# Patient Record
Sex: MALE | Race: OTHER | NOT HISPANIC OR LATINO | URBAN - METROPOLITAN AREA
[De-identification: names, ages, dates, MRNs, and addresses within clinical notes are randomized per-mention and may not be internally consistent; named-entity substitution may affect disease eponyms.]

---

## 2020-05-05 ENCOUNTER — INPATIENT (INPATIENT)
Facility: HOSPITAL | Age: 85
LOS: 5 days | Discharge: HOME | DRG: 480 | End: 2020-05-11
Attending: FAMILY MEDICINE | Admitting: HOSPITALIST
Payer: MEDICARE

## 2020-05-05 VITALS
OXYGEN SATURATION: 95 % | SYSTOLIC BLOOD PRESSURE: 145 MMHG | RESPIRATION RATE: 16 BRPM | HEART RATE: 77 BPM | WEIGHT: 149.91 LBS | HEIGHT: 67 IN | DIASTOLIC BLOOD PRESSURE: 71 MMHG

## 2020-05-05 DIAGNOSIS — Z71.89 OTHER SPECIFIED COUNSELING: ICD-10-CM

## 2020-05-05 DIAGNOSIS — E11.9 TYPE 2 DIABETES MELLITUS WITHOUT COMPLICATIONS: ICD-10-CM

## 2020-05-05 DIAGNOSIS — S72.92XA UNSPECIFIED FRACTURE OF LEFT FEMUR, INITIAL ENCOUNTER FOR CLOSED FRACTURE: ICD-10-CM

## 2020-05-05 DIAGNOSIS — Z96.659 PRESENCE OF UNSPECIFIED ARTIFICIAL KNEE JOINT: Chronic | ICD-10-CM

## 2020-05-05 DIAGNOSIS — Z29.9 ENCOUNTER FOR PROPHYLACTIC MEASURES, UNSPECIFIED: ICD-10-CM

## 2020-05-05 DIAGNOSIS — S32.592A OTHER SPECIFIED FRACTURE OF LEFT PUBIS, INITIAL ENCOUNTER FOR CLOSED FRACTURE: ICD-10-CM

## 2020-05-05 DIAGNOSIS — C34.90 MALIGNANT NEOPLASM OF UNSPECIFIED PART OF UNSPECIFIED BRONCHUS OR LUNG: ICD-10-CM
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
## 2020-05-05 DIAGNOSIS — N40.0 BENIGN PROSTATIC HYPERPLASIA WITHOUT LOWER URINARY TRACT SYMPTOMS: ICD-10-CM

## 2020-05-05 DIAGNOSIS — Z90.49 ACQUIRED ABSENCE OF OTHER SPECIFIED PARTS OF DIGESTIVE TRACT: Chronic | ICD-10-CM

## 2020-05-05 DIAGNOSIS — J12.9 VIRAL PNEUMONIA, UNSPECIFIED: ICD-10-CM

## 2020-05-05 DIAGNOSIS — F03.90 UNSPECIFIED DEMENTIA WITHOUT BEHAVIORAL DISTURBANCE: ICD-10-CM

## 2020-05-05 DIAGNOSIS — I62.03 NONTRAUMATIC CHRONIC SUBDURAL HEMORRHAGE: ICD-10-CM

## 2020-05-05 LAB
ANION GAP SERPL CALC-SCNC: 6 MMOL/L — SIGNIFICANT CHANGE UP (ref 5–17)
APTT BLD: 29.5 SEC — SIGNIFICANT CHANGE UP (ref 28.5–37)
BASOPHILS # BLD AUTO: 0.06 K/UL — SIGNIFICANT CHANGE UP (ref 0–0.2)
BASOPHILS NFR BLD AUTO: 0.6 % — SIGNIFICANT CHANGE UP (ref 0–2)
BUN SERPL-MCNC: 14 MG/DL — SIGNIFICANT CHANGE UP (ref 7–23)
CALCIUM SERPL-MCNC: 9.3 MG/DL — SIGNIFICANT CHANGE UP (ref 8.5–10.1)
CHLORIDE SERPL-SCNC: 100 MMOL/L — SIGNIFICANT CHANGE UP (ref 96–108)
CO2 SERPL-SCNC: 27 MMOL/L — SIGNIFICANT CHANGE UP (ref 22–31)
CREAT SERPL-MCNC: 0.56 MG/DL — SIGNIFICANT CHANGE UP (ref 0.5–1.3)
EOSINOPHIL # BLD AUTO: 0.13 K/UL — SIGNIFICANT CHANGE UP (ref 0–0.5)
EOSINOPHIL NFR BLD AUTO: 1.2 % — SIGNIFICANT CHANGE UP (ref 0–6)
GLUCOSE SERPL-MCNC: 122 MG/DL — HIGH (ref 70–99)
HCT VFR BLD CALC: 33.1 % — LOW (ref 39–50)
HGB BLD-MCNC: 10.3 G/DL — LOW (ref 13–17)
IMM GRANULOCYTES NFR BLD AUTO: 3.9 % — HIGH (ref 0–1.5)
INR BLD: 1 RATIO — SIGNIFICANT CHANGE UP (ref 0.88–1.16)
LYMPHOCYTES # BLD AUTO: 1.71 K/UL — SIGNIFICANT CHANGE UP (ref 1–3.3)
LYMPHOCYTES # BLD AUTO: 16.3 % — SIGNIFICANT CHANGE UP (ref 13–44)
MCHC RBC-ENTMCNC: 23.8 PG — LOW (ref 27–34)
MCHC RBC-ENTMCNC: 31.1 GM/DL — LOW (ref 32–36)
MCV RBC AUTO: 76.4 FL — LOW (ref 80–100)
MONOCYTES # BLD AUTO: 1.38 K/UL — HIGH (ref 0–0.9)
MONOCYTES NFR BLD AUTO: 13.1 % — SIGNIFICANT CHANGE UP (ref 2–14)
NEUTROPHILS # BLD AUTO: 6.82 K/UL — SIGNIFICANT CHANGE UP (ref 1.8–7.4)
NEUTROPHILS NFR BLD AUTO: 64.9 % — SIGNIFICANT CHANGE UP (ref 43–77)
NRBC # BLD: 0 /100 WBCS — SIGNIFICANT CHANGE UP (ref 0–0)
PLATELET # BLD AUTO: 391 K/UL — SIGNIFICANT CHANGE UP (ref 150–400)
POTASSIUM SERPL-MCNC: 4.2 MMOL/L — SIGNIFICANT CHANGE UP (ref 3.5–5.3)
POTASSIUM SERPL-SCNC: 4.2 MMOL/L — SIGNIFICANT CHANGE UP (ref 3.5–5.3)
PROTHROM AB SERPL-ACNC: 11.2 SEC — SIGNIFICANT CHANGE UP (ref 10–12.9)
RBC # BLD: 4.33 M/UL — SIGNIFICANT CHANGE UP (ref 4.2–5.8)
RBC # FLD: 17.3 % — HIGH (ref 10.3–14.5)
SODIUM SERPL-SCNC: 133 MMOL/L — LOW (ref 135–145)
WBC # BLD: 10.51 K/UL — HIGH (ref 3.8–10.5)
WBC # FLD AUTO: 10.51 K/UL — HIGH (ref 3.8–10.5)

## 2020-05-05 PROCEDURE — 73552 X-RAY EXAM OF FEMUR 2/>: CPT | Mod: 26,LT

## 2020-05-05 PROCEDURE — 73502 X-RAY EXAM HIP UNI 2-3 VIEWS: CPT | Mod: 26,LT

## 2020-05-05 PROCEDURE — 99223 1ST HOSP IP/OBS HIGH 75: CPT | Mod: GC

## 2020-05-05 PROCEDURE — 73700 CT LOWER EXTREMITY W/O DYE: CPT | Mod: 26,LT,GV

## 2020-05-05 PROCEDURE — 71045 X-RAY EXAM CHEST 1 VIEW: CPT | Mod: 26

## 2020-05-05 PROCEDURE — 73562 X-RAY EXAM OF KNEE 3: CPT | Mod: 26,LT,GV

## 2020-05-05 PROCEDURE — 70450 CT HEAD/BRAIN W/O DYE: CPT | Mod: 26

## 2020-05-05 PROCEDURE — 73552 X-RAY EXAM OF FEMUR 2/>: CPT | Mod: 26,RT,77

## 2020-05-05 PROCEDURE — 99284 EMERGENCY DEPT VISIT MOD MDM: CPT | Mod: CS

## 2020-05-05 PROCEDURE — 93010 ELECTROCARDIOGRAM REPORT: CPT | Mod: GV

## 2020-05-05 PROCEDURE — 73590 X-RAY EXAM OF LOWER LEG: CPT | Mod: 26,50,GV

## 2020-05-05 PROCEDURE — 73090 X-RAY EXAM OF FOREARM: CPT | Mod: 26,50,GV

## 2020-05-05 PROCEDURE — 72192 CT PELVIS W/O DYE: CPT | Mod: GV

## 2020-05-05 PROCEDURE — 73060 X-RAY EXAM OF HUMERUS: CPT | Mod: 26,GV

## 2020-05-05 PROCEDURE — 73552 X-RAY EXAM OF FEMUR 2/>: CPT | Mod: 26,LT,77

## 2020-05-05 RX ORDER — METFORMIN HYDROCHLORIDE 850 MG/1
1 TABLET ORAL
Qty: 0 | Refills: 0 | DISCHARGE

## 2020-05-05 RX ORDER — POLYETHYLENE GLYCOL 3350 17 G/17G
17 POWDER, FOR SOLUTION ORAL DAILY
Refills: 0 | Status: DISCONTINUED | OUTPATIENT
Start: 2020-05-05 | End: 2020-05-07

## 2020-05-05 RX ORDER — GLUCAGON INJECTION, SOLUTION 0.5 MG/.1ML
1 INJECTION, SOLUTION SUBCUTANEOUS ONCE
Refills: 0 | Status: DISCONTINUED | OUTPATIENT
Start: 2020-05-05 | End: 2020-05-07

## 2020-05-05 RX ORDER — SENNA PLUS 8.6 MG/1
2 TABLET ORAL AT BEDTIME
Refills: 0 | Status: DISCONTINUED | OUTPATIENT
Start: 2020-05-05 | End: 2020-05-07

## 2020-05-05 RX ORDER — OXYCODONE AND ACETAMINOPHEN 5; 325 MG/1; MG/1
2 TABLET ORAL EVERY 6 HOURS
Refills: 0 | Status: DISCONTINUED | OUTPATIENT
Start: 2020-05-05 | End: 2020-05-07

## 2020-05-05 RX ORDER — INSULIN LISPRO 100/ML
VIAL (ML) SUBCUTANEOUS EVERY 6 HOURS
Refills: 0 | Status: DISCONTINUED | OUTPATIENT
Start: 2020-05-05 | End: 2020-05-07

## 2020-05-05 RX ORDER — SODIUM CHLORIDE 9 MG/ML
1000 INJECTION, SOLUTION INTRAVENOUS
Refills: 0 | Status: DISCONTINUED | OUTPATIENT
Start: 2020-05-05 | End: 2020-05-07

## 2020-05-05 RX ORDER — ACETAMINOPHEN 500 MG
650 TABLET ORAL EVERY 6 HOURS
Refills: 0 | Status: DISCONTINUED | OUTPATIENT
Start: 2020-05-05 | End: 2020-05-07

## 2020-05-05 RX ORDER — MORPHINE SULFATE 50 MG/1
2 CAPSULE, EXTENDED RELEASE ORAL EVERY 6 HOURS
Refills: 0 | Status: DISCONTINUED | OUTPATIENT
Start: 2020-05-05 | End: 2020-05-07

## 2020-05-05 RX ORDER — HEPARIN SODIUM 5000 [USP'U]/ML
5000 INJECTION INTRAVENOUS; SUBCUTANEOUS EVERY 8 HOURS
Refills: 0 | Status: DISCONTINUED | OUTPATIENT
Start: 2020-05-05 | End: 2020-05-05

## 2020-05-05 RX ORDER — TAMSULOSIN HYDROCHLORIDE 0.4 MG/1
1 CAPSULE ORAL
Qty: 0 | Refills: 0 | DISCHARGE

## 2020-05-05 RX ORDER — DEXTROSE 50 % IN WATER 50 %
15 SYRINGE (ML) INTRAVENOUS ONCE
Refills: 0 | Status: DISCONTINUED | OUTPATIENT
Start: 2020-05-05 | End: 2020-05-07

## 2020-05-05 RX ORDER — FINASTERIDE 5 MG/1
1 TABLET, FILM COATED ORAL
Qty: 0 | Refills: 0 | DISCHARGE

## 2020-05-05 RX ORDER — ALBUTEROL 90 UG/1
2 AEROSOL, METERED ORAL EVERY 6 HOURS
Refills: 0 | Status: DISCONTINUED | OUTPATIENT
Start: 2020-05-05 | End: 2020-05-07

## 2020-05-05 RX ORDER — DEXTROSE 50 % IN WATER 50 %
25 SYRINGE (ML) INTRAVENOUS ONCE
Refills: 0 | Status: DISCONTINUED | OUTPATIENT
Start: 2020-05-05 | End: 2020-05-07

## 2020-05-05 RX ORDER — TAMSULOSIN HYDROCHLORIDE 0.4 MG/1
0.4 CAPSULE ORAL AT BEDTIME
Refills: 0 | Status: DISCONTINUED | OUTPATIENT
Start: 2020-05-05 | End: 2020-05-07

## 2020-05-05 RX ORDER — FINASTERIDE 5 MG/1
5 TABLET, FILM COATED ORAL DAILY
Refills: 0 | Status: DISCONTINUED | OUTPATIENT
Start: 2020-05-05 | End: 2020-05-07

## 2020-05-05 RX ORDER — OXYCODONE AND ACETAMINOPHEN 5; 325 MG/1; MG/1
1 TABLET ORAL EVERY 6 HOURS
Refills: 0 | Status: DISCONTINUED | OUTPATIENT
Start: 2020-05-05 | End: 2020-05-07

## 2020-05-05 RX ORDER — DEXTROSE 50 % IN WATER 50 %
12.5 SYRINGE (ML) INTRAVENOUS ONCE
Refills: 0 | Status: DISCONTINUED | OUTPATIENT
Start: 2020-05-05 | End: 2020-05-07

## 2020-05-05 NOTE — ED PROVIDER NOTE - OBJECTIVE STATEMENT
84 y/o male with PMHx Bone cancer with mets, BPH, Dementia, Diabetes, Lung cancer, TIA BIBA from home due to fall. pt fell out of bed. As per EMS, concern for hip fx. 86 y/o male with PMHx Lung cancer with mets to bones, BPH, Dementia, Diabetes, Lung cancer, TIA BIBA from home due to fall. pt fell out of bed. As per EMS, concern for hip fx.

## 2020-05-05 NOTE — H&P ADULT - ATTENDING COMMENTS
evaluated 5/5/2020  86 y/o male with PMHx Lung cancer with mets to bones, BPH, Dementia, Non-insulin dependent T2DM, Lung cancer, TIA metastatic pathologic fracture. Also cxr findings suggestive of atypical pneumonia, r/o COVID 19.   Ortho to evaluate for palliative surgery.  Pathologic fx.  Currently on home hospice.  MOLST form filled out.    Medically optimized for procedure.  Daughter was called and aware of admission and care.

## 2020-05-05 NOTE — H&P ADULT - PROBLEM SELECTOR PLAN 3
Followup XR of b/l forearm, humerus, b/l tibia + fibula and R femur  - Ortho following- Dr. Silverman  - Plan as above #1

## 2020-05-05 NOTE — H&P ADULT - PROBLEM SELECTOR PLAN 2
Acute hypoxic respiratory failure 2/2 PNA in setting of suspected COVID19 viral illness given clinical presentation, CXR findings suggestive of COVID pneumonia  - Days of symptoms- difficult to assess as pt takes albuterol inhaler prn and is demented so unable to verbalize symptoms. Per daughter, pt had occasional cough for ~1 month  - saturating well on room air. Consider nasal cannula if needed  -  AVOID HFNC/Bipap/Nebs  - Contact and droplet isolation precautions  - Check CRP, ferritin, procalc   - Tylenol prn for fever  - may use albuterol/ipratroprium inhaler  - Start supplemental oxygen therapy immediately if pt has respiratory distress target SpO2 > 90%  - NEWS2 score 0 (if >= 7 high risk, consider ICU consult)  - monitor respiratory status closely  - patient is at a low risk of decompensation at this time  - Code Status: DNR/DNI, on home hospice  - MOLST form: chart Acute hypoxic respiratory failure 2/2 PNA in setting of suspected COVID19 viral illness given clinical presentation, CXR findings suggestive of COVID pneumonia  - COVID 19 test pending  - Days of symptoms- difficult to assess as pt takes albuterol inhaler prn and is demented so unable to verbalize symptoms. Per daughter, pt had occasional cough for ~1 month  - saturating well on room air. Consider nasal cannula if needed  -  AVOID HFNC/Bipap/Nebs  - Contact and droplet isolation precautions  - Check CRP, ferritin, procalc   - Tylenol prn for fever  - may use albuterol/ipratroprium inhaler  - Start supplemental oxygen therapy immediately if pt has respiratory distress target SpO2 > 90%  - NEWS2 score 0 (if >= 7 high risk, consider ICU consult)  - monitor respiratory status closely  - patient is at a low risk of decompensation at this time  - Code Status: DNR/DNI, on home hospice  - MOLST form: chart

## 2020-05-05 NOTE — H&P ADULT - PROBLEM SELECTOR PLAN 10
Pt on home hospice  - Had discussion with daughter, Pati Cuba [phone] who is HCP  - Pt is DNR/DNI. Goal is for patient to be comfortable. MOLST completed and placed in chart.

## 2020-05-05 NOTE — H&P ADULT - PROBLEM SELECTOR PLAN 6
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

## 2020-05-05 NOTE — ED ADULT NURSE NOTE - PMH
Bone cancer due to spread from other site    BPH (benign prostatic hyperplasia)    Dementia    Diabetes    Lung cancer    TIA (transient ischemic attack)

## 2020-05-05 NOTE — GOALS OF CARE CONVERSATION - ADVANCED CARE PLANNING - CONVERSATION DETAILS
Pt is on home hospice. Has MOLST from [facility] at home. Per daughter, Pati Cuba, [phone], pt is DNR/DNI.

## 2020-05-05 NOTE — H&P ADULT - ASSESSMENT
84 y/o male with PMHx Lung cancer with mets to bones, BPH, Dementia, Non-insulin dependent T2DM, Lung cancer, TIA BIBA from home due to fall out of bed. Found to have destructive lytic lesion involving the left superior pubic ramus, likely metastatic pathologic fracture. Also cxr findings suggestive of atypical pneumonia, r/o COVID 19.

## 2020-05-05 NOTE — ED PROVIDER NOTE - CARE PLAN
Principal Discharge DX:	Femur fracture, left  Secondary Diagnosis:	Viral pneumonia Principal Discharge DX:	Femur fracture, left  Secondary Diagnosis:	Viral pneumonia  Secondary Diagnosis:	Subdural hematoma

## 2020-05-05 NOTE — ED PROVIDER NOTE - ATTENDING CONTRIBUTION TO CARE
84 yo male with fall few weeks ago and since that time has been unable to get out of bed or ambulate. Exam revealed male in mild distress with marked tenderness and pain left mid thigh with intact N/V LLE. I agree with plan and management outlined by PA.

## 2020-05-05 NOTE — ED PROVIDER NOTE - PHYSICAL EXAMINATION
Constitutional: Awake, non-toxic. NAD.   HEAD: Normocephalic, atraumatic.   EYES: PERRL, EOM intact, conjunctiva and sclera are clear bilaterally. No raccoon eyes.   ENT: No nguyen sign. No rhinorrhea, mucous membranes pink/moist,  no drooling or stridor.   NECK: Supple, non-tender  BACK: No midline or paraspinal TTP of cervical/thoracic/lumbar spine, FROM. No ecchymosis or hematomas.   CARDIOVASCULAR: Normal S1, S2; regular rate and rhythm.  RESPIRATORY: Normal respiratory effort; breath sounds CTAB, no wheezes, rhonchi, or rales. Speaking in full sentences. No accessory muscle use.   ABDOMEN: Soft; non-tender, non-distended.   EXTREMITIES: Full passive and active ROM in all extremities; (+) left hip TTP, (+) external rotation, (+) left hip bruising; distal pulses palpable and symmetric, no edema, no crepitus or step off  SKIN: Warm, dry; no apparent lesions or rashes, brisk capillary refill.  NEURO: Cn 2-12 intact, not following commands or answering questions , hand  intact, no facial droop.

## 2020-05-05 NOTE — H&P ADULT - NSHPPHYSICALEXAM_GEN_ALL_CORE
Vital Signs Last 24 Hrs  HR: 77 (05 May 2020 18:44) (77 - 77)  BP: 145/71 (05 May 2020 18:44) (145/71 - 145/71)  RR: 16 (05 May 2020 18:44) (16 - 16)  SpO2: 95% (05 May 2020 18:44) (95% - 95%)    Physical Exam:  General: Well developed, well nourished, NAD  HEENT: NCAT, PERRLA, EOMI bl, moist mucous membranes   Neck: Supple, nontender, no mass  Neurology: A&Ox3, nonfocal, CN II-XII grossly intact, sensation intact, no gait abnormalities   Respiratory: CTA B/L, No W/R/R  CV: RRR, +S1/S2, no murmurs, rubs or gallops  Abdominal: Soft, NT, ND +BSx4  Extremities: No C/C/E, + peripheral pulses  MSK: Normal ROM, no joint erythema or warmth, no joint swelling   Skin: warm, dry, normal color, no rash or abnormal lesions Vital Signs Last 24 Hrs  HR: 77 (05 May 2020 18:44) (77 - 77)  BP: 145/71 (05 May 2020 18:44) (145/71 - 145/71)  RR: 16 (05 May 2020 18:44) (16 - 16)  SpO2: 95% (05 May 2020 18:44) (95% - 95%)    Physical Exam:  General: elderly, frail male, lying in bed comfortably, NAD  HEENT: NCAT, dry mucous membranes, Hard of hearing  Neurology: A&Ox1 (person), unable to follow commands  Respiratory: RUL exp wheezing, remainder of lungs clear, no rhonchi, no rales  CV: RRR, +S1/S2, no murmurs, rubs or gallops  Abdominal: Soft, NT, ND +BSx4  Extremities: No C/C/E, + peripheral pulses  MSK: no joint swelling   Skin: large ecchymosis of left lateral hip/thigh extending down to above knee

## 2020-05-05 NOTE — ED PROVIDER NOTE - CLINICAL SUMMARY MEDICAL DECISION MAKING FREE TEXT BOX
BIBA from home due to fall last night. Reports left hip injury. Plan includes Xrays hip/femur/knee r/o fx/dislocation, ct head r/o intracranial bleed, re-assess

## 2020-05-05 NOTE — H&P ADULT - NSHPOUTPATIENTPROVIDERS_GEN_ALL_CORE
PMD- Dr. Virgil Sampson PMD- Dr. Virgil Sampson  Oncologist- Dr. White PMD- Dr. Virgil Sampson  Oncologist- Dr. Mauri White

## 2020-05-05 NOTE — H&P ADULT - PROBLEM SELECTOR PLAN 7
Chronic, stable  - reorient as needed  - Caution with BEERs medications  - poor PO intake. Consider speech and swallow when no longer NPO. Add glucerna to diet when able to be PO

## 2020-05-05 NOTE — H&P ADULT - PROBLEM SELECTOR PLAN 9
IMPROVE VTE Individual Risk Assessment          RISK                                                          Points  [  ] Previous VTE                                                3  [  ] Thrombophilia                                             2  [ x ] Lower limb paralysis                                   2        (unable to hold up >15 seconds)    [x  ] Current Cancer                                             2         (within 6 months)  [x  ] Immobilization > 24 hrs                              1  [  ] ICU/CCU stay > 24 hours                             1  [x  ] Age > 60                                                         1    IMPROVE VTE Score: 6  DVT ppx: Heparin sq

## 2020-05-05 NOTE — ED ADULT NURSE NOTE - OBJECTIVE STATEMENT
Pt from hospice, unwintessed fall out of bed last night, left leg/hip pain, outer part of upper hip/thigh is ecchymotic, pt has dementia and unable to provide any information, pt is DNR, afebrile, will continue to monitor

## 2020-05-05 NOTE — H&P ADULT - NSICDXPASTMEDICALHX_GEN_ALL_CORE_FT
PAST MEDICAL HISTORY:  Bone cancer due to spread from other site     BPH (benign prostatic hyperplasia)     Dementia     Diabetes     Lung cancer     TIA (transient ischemic attack) PAST MEDICAL HISTORY:  Bone cancer due to spread from other site     BPH (benign prostatic hyperplasia)     Dementia     Diabetes not on insulin    Lung cancer     TIA (transient ischemic attack)

## 2020-05-05 NOTE — H&P ADULT - NSHPSOCIALHISTORY_GEN_ALL_CORE
Lives with daughter. On home hospice  Ambulated occasionally with assistance. Bedridden for the last week.   Tobacco: heavy smoker, 4ppd x50 years, quit 15 years ago  Etoh: hx of heavy drinking, quit years ago  Recreational drugs: denies

## 2020-05-05 NOTE — H&P ADULT - PROBLEM SELECTOR PLAN 1
Admit to [facility]  - s/p fall causing pathologic fracture   - XR showed Destructive lytic lesion involving the left superior pubic ramus. Sclerosis involving the left inferior pubic ramus.   - Ortho following (Dr. Silverman)- may require palliative surgery  - Pain management with morphine and percocet  - NPO except meds  - fall risk  - miralax and senna while on opiates  - IVF  - Pt is intermediate risk for this moderate risk procedure. Considering palliative surgery, patient is medically optimized for surgery. RCRI score 1  - heparin 5000 x1 STAT per ortho. DVT ppx per ortho

## 2020-05-05 NOTE — ED ADULT NURSE NOTE - NSIMPLEMENTINTERV_GEN_ALL_ED
Implemented All Fall with Harm Risk Interventions:  [place] to call system. Call bell, personal items and telephone within reach. Instruct patient to call for assistance. Room bathroom lighting operational. Non-slip footwear when patient is off stretcher. Physically safe environment: no spills, clutter or unnecessary equipment. Stretcher in lowest position, wheels locked, appropriate side rails in place. Provide visual cue, wrist band, yellow gown, etc. Monitor gait and stability. Monitor for mental status changes and reorient to person, place, and time. Review medications for side effects contributing to fall risk. Reinforce activity limits and safety measures with patient and family. Provide visual clues: red socks.

## 2020-05-05 NOTE — H&P ADULT - PROBLEM SELECTOR PLAN 4
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 Mauri White at [place]  - Pt on home hospice   - oxygen supplementation prn

## 2020-05-05 NOTE — ED PROVIDER NOTE - PROGRESS NOTE DETAILS
Discussed with patient daughter [phone], reports patient living at home with her and has hospice nurse, but nurse hasn't been able to come due to COVID. Reports patient fell out of bed Friday and last night. Unwitnessed fall. Reports patient was sent in as the nurse noted concern for left hip fx or dislocation. Pt DNR. notes patient has hx of dementia, not responsive, which is baseline for 6 months. PCP Maninder Sampson. She reports patient has not had other symptoms. denies SOB, cough, fever, vomiting, decreased PO, or any other complaints. Discussed with Андрей from ortho. Requesting CT. Discussed with Hospitalist, accepting admission. Discussed with Hospitalist, accepting admission. Discussed Ct findings with Dr. Guevara

## 2020-05-05 NOTE — CONSULT NOTE ADULT - SUBJECTIVE AND OBJECTIVE BOX
84 yo Male w/ severe demenita (AOx) and Lung CA (with known mets to bone) who presented to  ED after a fall from home. He is on home hospice and watched with a monitor. He has had two falls one on friday but did not seek medical attention 2/2 Concerns regarding COVID-19 and another yesterday. He typically walks with a walker but has been bedridden for the past week. Patient unable to relay acute complaints so history was largely obtained from Pati the daughter/HCP.    PAST MEDICAL & SURGICAL HISTORY:  Unspecified fracture of left femur, initial encounter for closed fracture  MEWS Score  BPH (benign prostatic hyperplasia)  TIA (transient ischemic attack)  Bone cancer due to spread from other site  Lung cancer  Dementia  Diabetes  Femur fracture, left  Need for prophylactic measure  BPH (benign prostatic hyperplasia)  Dementia  Diabetes  Lung cancer  Viral pneumonia  Pubic ramus fracture, left, closed, initial encounter  H/O total knee replacement  History of cholecystectomy  LEFT HIP PAIN  Viral pneumonia      Allergies:  No Known Allergies      Medications:      Vital Signs:  T(C): --  HR: 77 (05-05-20 @ 18:44) (77 - 77)  BP: 145/71 (05-05-20 @ 18:44) (145/71 - 145/71)  RR: 16 (05-05-20 @ 18:44) (16 - 16)  SpO2: 95% (05-05-20 @ 18:44) (95% - 95%)    Physical Exam:  Left knee:  LLE slightly short. +moderate swelling distal femur, no erythema, no ecchymosis, normothermic. +TTP over distal femur. LROM 2' pain. Calves are soft and nontender. Compartments soft and compressible. Moving all toes, sensation intact. DP and PT pulses palpable.     SECONDARY EXAM: Benign, Skin intact, SILT throughout, motor grossly intact throughout, no other orthopedic injuries at this time, compartments soft and compressible    SPINE: Skin intact, no bony tenderness or step-offs appreciated throughout cervical/thoracic/lumbar/sacral spine    B/L UE: Skin intact, no erythema, ecchymosis, edema, gross deformity, NTTP over the bony prominences of the shoulder/elbow/wrist/hand, painless passive/active ROM of the shoulder/elbow/wrist/hand, C5-T1 SILT, motor grossly intact throughout axillary/musculocutaenous/radial/median/ulnar nerves, + radial pulse    R LE: Skin intact, no erythema, ecchymosis, edema, gross deformity, NTTP over the bony prominences of the hip/knee/ankle/foot, painless passive/active ROM of the hip/knee/ankle/foot, L2-S1 SILT, motor grossly intact throughout hip flexors/quads/hams/TA/EHL/FHL/GSC, + DP/PT pulses, no pain with log roll, no pain on axial loading, compartments soft and compressible, calves nontender      Imaging:   XR L femur: periimplant distal femur fracture  CT L Femur fx: cortical scalloping suggestive of pathologic disease, official read pending  XR/CT Pelvis: severe osteolytic lesions of the left superior pubic rami and left iliac wing    Assessment:  85y Male with a left periprosthetic distal femur fracture    Plan:  Knee immobilizer placed on LLE.  Complete bedrest.  DVT prophylaxis - on hold  Pain control.  Admit to medical team    ****INCOMPLETE NOTE//IN PROGRESS***  ****INCOMPLETE NOTE//IN PROGRESS***  ****INCOMPLETE NOTE//IN PROGRESS***      Pt advised that surgical fixation is needed for right**/**left periprosthetic distal femur fracture and surgical procedure discussed in detail including all risks, benefits and alternatives; pt**/**pts family express understanding and consent for procedure.  Plan for OR tonight**/**today.  f/u medical clearance/optimization for OR.  NPO and hold chemical anticoagulation from now**/after midnight.  IVF hydration while NPO.    Case discussed with and plan as per  **** 84 yo Male w/ severe demenita (AOx) and Lung CA (with known mets to bone) who presented to  ED after a fall from home. He is on home hospice and watched with a monitor. He has had two falls one on friday but did not seek medical attention 2/2 Concerns regarding COVID-19 and another yesterday. He typically walks with a walker but has been bedridden for the past week. Patient unable to relay acute complaints so history was largely obtained from Pati the daughter/HCP.    PAST MEDICAL & SURGICAL HISTORY:  Unspecified fracture of left femur, initial encounter for closed fracture  MEWS Score  BPH (benign prostatic hyperplasia)  TIA (transient ischemic attack)  Bone cancer due to spread from other site  Lung cancer  Dementia  Diabetes  Femur fracture, left  Need for prophylactic measure  BPH (benign prostatic hyperplasia)  Dementia  Diabetes  Lung cancer  Viral pneumonia  Pubic ramus fracture, left, closed, initial encounter  H/O total knee replacement  History of cholecystectomy  LEFT HIP PAIN  Viral pneumonia      Allergies:  No Known Allergies      Medications:      Vital Signs:  T(C): --  HR: 77 (05-05-20 @ 18:44) (77 - 77)  BP: 145/71 (05-05-20 @ 18:44) (145/71 - 145/71)  RR: 16 (05-05-20 @ 18:44) (16 - 16)  SpO2: 95% (05-05-20 @ 18:44) (95% - 95%)    Physical Exam:  Left knee:  LLE slightly short. +moderate swelling distal femur, no erythema, no ecchymosis, normothermic. +TTP over distal femur. LROM 2' pain. Calves are soft and nontender. Compartments soft and compressible. Moving all toes, sensation intact. DP and PT pulses palpable.     SECONDARY EXAM: Benign, Skin intact, SILT throughout, motor grossly intact throughout, no other orthopedic injuries at this time, compartments soft and compressible    SPINE: Skin intact, no bony tenderness or step-offs appreciated throughout cervical/thoracic/lumbar/sacral spine    B/L UE: Skin intact, no erythema, ecchymosis, edema, gross deformity, NTTP over the bony prominences of the shoulder/elbow/wrist/hand, painless passive/active ROM of the shoulder/elbow/wrist/hand, C5-T1 SILT, motor grossly intact throughout axillary/musculocutaenous/radial/median/ulnar nerves, + radial pulse    R LE: Skin intact, no erythema, ecchymosis, edema, gross deformity, NTTP over the bony prominences of the hip/knee/ankle/foot, painless passive/active ROM of the hip/knee/ankle/foot, L2-S1 SILT, motor grossly intact throughout hip flexors/quads/hams/TA/EHL/FHL/GSC, + DP/PT pulses, no pain with log roll, no pain on axial loading, compartments soft and compressible, calves nontender      Imaging:   XR L femur: periimplant distal femur fracture  CT L Femur fx: cortical scalloping suggestive of pathologic disease, official read pending  XR/CT Pelvis: severe osteolytic lesions of the left superior pubic rami and left iliac wing    Assessment:  85y Male with a left periprosthetic distal femur fracture    Plan:  -Cased discussed at length with daughter/HCP (Pati). Patient has a periprosthetic left distal femur fracture with osseous mets. Pt is on home hospice and DNR/DNI. In order to keep the patient out of pain, the daughter/HCP would like to go ahead with operative fixation.   -Benefits and risks reviewed with the Health care proxy. Risks addressed include but are not limited to infection, blood, loss, coagulopathy, venothromboelmoblism (DVT/PE), vessel/nerve damage, scar formation, nonunion/malunion, persistent pain, post-traumatic arthritis and the even the risk of death.  -Knee immobilizer placed on LLE.  -Complete bedrest.  -DVT prophylaxis - on hold  -Pain control.  -Admit to medical team  -Npo after midnight/IVF  -FU AM labs  -Please document medical clearance  -FU COVID-19 swab    Case discussed with Dr. Silverman who agrees with plan

## 2020-05-05 NOTE — H&P ADULT - HISTORY OF PRESENT ILLNESS
86 y/o male with PMHx Lung cancer with mets to bones, BPH, Dementia, Non-insulin dependent T2DM, Lung cancer, TIA BIBA from home due to fall out of bed. Patient unable to give history due to dementia. Multiple  attempts to call daughter, Pati Cuba ([phone]).     In the ED, patient's vitals were: /71, HR 77, RR 16 and SpO2 95% on room air. Significant labs include: wbc 10.51, H/H 10.3/33.1, MCV 76.4, Na 133.   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 No midline shift or hydrocephalus. Mildly motion limited study.  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 No midline shift or hydrocephalus. Mildly motion limited study.  CXR: Bilateral interstitial and airspace infiltrates suggestive of atypical pneumonia/viral pneumonia including COVID 19.   EKG: SR with APCs, HR 85 86 y/o male with PMHx Lung cancer with mets to bones, BPH, Dementia, Non-insulin dependent T2DM, Lung cancer, TIA BIBA from home due to fall out of bed. Patient unable to give history due to dementia. Multiple  attempts to call daughter, Pati Cuba ([phone]).     In the ED, patient's vitals were: /71, HR 77, RR 16 and SpO2 95% on room air. Significant labs include: wbc 10.51, H/H 10.3/33.1, MCV 76.4, Na 133.   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 No midline shift or hydrocephalus. Mildly motion limited study.  L XR hip/pelvis: Destructive lytic lesion involving the left superior pubic ramus. Sclerosis involving the left inferior pubic ramus. The findings are likely metastatic. Cannot rule out underlying pathologic fracture. Consider CT scan for additional diagnostic benefit.  Comminuted displaced angulated distal left femoral shaft fracture just above the femoral component of a bipolar knee prosthesis. The fracture may be pathologic.     CXR: Bilateral interstitial and airspace infiltrates suggestive of atypical pneumonia/viral pneumonia including COVID 19.   EKG: SR with APCs, HR 85 84 y/o male with PMHx Lung cancer with mets to bones on home hospice, BPH, Dementia, Non-insulin dependent T2DM, Lung cancer, TIA BIBA from home due to fall out of bed. Patient unable to give history due to dementia. History obtained from Pati Cuba ([phone]- cell). Pt fell on 5/1/20 out of bed and fell again on 5/4. States that home hospice was unable to have patient receive outpatient XR and was advised to go to hospital. Patient occasionally ambulates and has been bedridden for 1 week. Patient     In the ED, patient's vitals were: /71, HR 77, RR 16 and SpO2 95% on room air. Significant labs include: wbc 10.51, H/H 10.3/33.1, MCV 76.4, Na 133.   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 No midline shift or hydrocephalus. Mildly motion limited study.  L XR hip/pelvis: Destructive lytic lesion involving the left superior pubic ramus. Sclerosis involving the left inferior pubic ramus. The findings are likely metastatic. Cannot rule out underlying pathologic fracture. Consider CT scan for additional diagnostic benefit.  Comminuted displaced angulated distal left femoral shaft fracture just above the femoral component of a bipolar knee prosthesis. The fracture may be pathologic.     CXR: Bilateral interstitial and airspace infiltrates suggestive of atypical pneumonia/viral pneumonia including COVID 19.   EKG: SR with APCs, HR 85 84 y/o male with PMHx Lung cancer with mets to bones (dx 8/2019) on home hospice, BPH, Dementia, Non-insulin dependent T2DM, hx TIA BIBA from home due to fall out of bed. Patient unable to give history due to dementia. History obtained from Pati Cuba ([phone]- cell). Pt fell on 5/1/20 out of bed and fell again on 5/4. States that home hospice was unable to have patient receive outpatient XR and was advised to go to hospital. Patient occasionally ambulates and has been bedridden for the last week. Patient has been taking Percocet and Ativan ATC for pain but due to dementia, daughter unable to gauge his pain level. States that patient had a dry cough about a month ago and occasionally takes albuterol inhaler when he appears sob. Denies fevers/chills, n/v, diarrhea, abdominal pain, recent travel or COVID exposure. Of note, patient has not had any treatment for metastatic lung cancer due to rapid progression and onset of dementia.    In the ED, patient's vitals were: /71, HR 77, RR 16 and SpO2 95% on room air. Significant labs include: wbc 10.51, H/H 10.3/33.1, MCV 76.4, Na 133.   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 No midline shift or hydrocephalus. Mildly motion limited study.  L XR hip/pelvis: Destructive lytic lesion involving the left superior pubic ramus. Sclerosis involving the left inferior pubic ramus. The findings are likely metastatic. Cannot rule out underlying pathologic fracture. Consider CT scan for additional diagnostic benefit.  Comminuted displaced angulated distal left femoral shaft fracture just above the femoral component of a bipolar knee prosthesis. The fracture may be pathologic.   CXR: Bilateral interstitial and airspace infiltrates suggestive of atypical pneumonia/viral pneumonia including COVID 19.   CT LLE and pelvis pending results  EKG: SR with APCs, HR 85

## 2020-05-06 LAB
A1C WITH ESTIMATED AVERAGE GLUCOSE RESULT: 7.2 % — HIGH (ref 4–5.6)
ABO RH CONFIRMATION: SIGNIFICANT CHANGE UP
ANION GAP SERPL CALC-SCNC: 9 MMOL/L — SIGNIFICANT CHANGE UP (ref 5–17)
APTT BLD: 24.4 SEC — LOW (ref 28.5–37)
BASOPHILS # BLD AUTO: 0.06 K/UL — SIGNIFICANT CHANGE UP (ref 0–0.2)
BASOPHILS NFR BLD AUTO: 0.5 % — SIGNIFICANT CHANGE UP (ref 0–2)
BUN SERPL-MCNC: 14 MG/DL — SIGNIFICANT CHANGE UP (ref 7–23)
CALCIUM SERPL-MCNC: 9.8 MG/DL — SIGNIFICANT CHANGE UP (ref 8.5–10.1)
CHLORIDE SERPL-SCNC: 100 MMOL/L — SIGNIFICANT CHANGE UP (ref 96–108)
CO2 SERPL-SCNC: 26 MMOL/L — SIGNIFICANT CHANGE UP (ref 22–31)
CREAT SERPL-MCNC: 0.6 MG/DL — SIGNIFICANT CHANGE UP (ref 0.5–1.3)
CRP SERPL-MCNC: 5.17 MG/DL — HIGH (ref 0–0.4)
D DIMER BLD IA.RAPID-MCNC: 976 NG/ML DDU — HIGH
EOSINOPHIL # BLD AUTO: 0.08 K/UL — SIGNIFICANT CHANGE UP (ref 0–0.5)
EOSINOPHIL NFR BLD AUTO: 0.7 % — SIGNIFICANT CHANGE UP (ref 0–6)
ESTIMATED AVERAGE GLUCOSE: 160 MG/DL — HIGH (ref 68–114)
FERRITIN SERPL-MCNC: 758 NG/ML — HIGH (ref 30–400)
GLUCOSE SERPL-MCNC: 121 MG/DL — HIGH (ref 70–99)
HCT VFR BLD CALC: 33.1 % — LOW (ref 39–50)
HGB BLD-MCNC: 10.2 G/DL — LOW (ref 13–17)
IMM GRANULOCYTES NFR BLD AUTO: 2.8 % — HIGH (ref 0–1.5)
INR BLD: 1.03 RATIO — SIGNIFICANT CHANGE UP (ref 0.88–1.16)
LYMPHOCYTES # BLD AUTO: 1.48 K/UL — SIGNIFICANT CHANGE UP (ref 1–3.3)
LYMPHOCYTES # BLD AUTO: 13.2 % — SIGNIFICANT CHANGE UP (ref 13–44)
MCHC RBC-ENTMCNC: 23.7 PG — LOW (ref 27–34)
MCHC RBC-ENTMCNC: 30.8 GM/DL — LOW (ref 32–36)
MCV RBC AUTO: 76.8 FL — LOW (ref 80–100)
MONOCYTES # BLD AUTO: 1.26 K/UL — HIGH (ref 0–0.9)
MONOCYTES NFR BLD AUTO: 11.2 % — SIGNIFICANT CHANGE UP (ref 2–14)
NEUTROPHILS # BLD AUTO: 8.03 K/UL — HIGH (ref 1.8–7.4)
NEUTROPHILS NFR BLD AUTO: 71.6 % — SIGNIFICANT CHANGE UP (ref 43–77)
NRBC # BLD: 0 /100 WBCS — SIGNIFICANT CHANGE UP (ref 0–0)
PLATELET # BLD AUTO: 393 K/UL — SIGNIFICANT CHANGE UP (ref 150–400)
POTASSIUM SERPL-MCNC: 4.1 MMOL/L — SIGNIFICANT CHANGE UP (ref 3.5–5.3)
POTASSIUM SERPL-SCNC: 4.1 MMOL/L — SIGNIFICANT CHANGE UP (ref 3.5–5.3)
PROTHROM AB SERPL-ACNC: 11.5 SEC — SIGNIFICANT CHANGE UP (ref 10–12.9)
RBC # BLD: 4.31 M/UL — SIGNIFICANT CHANGE UP (ref 4.2–5.8)
RBC # FLD: 17.2 % — HIGH (ref 10.3–14.5)
SARS-COV-2 RNA SPEC QL NAA+PROBE: SIGNIFICANT CHANGE UP
SODIUM SERPL-SCNC: 135 MMOL/L — SIGNIFICANT CHANGE UP (ref 135–145)
WBC # BLD: 11.23 K/UL — HIGH (ref 3.8–10.5)
WBC # FLD AUTO: 11.23 K/UL — HIGH (ref 3.8–10.5)

## 2020-05-06 PROCEDURE — 99232 SBSQ HOSP IP/OBS MODERATE 35: CPT | Mod: GV

## 2020-05-06 RX ORDER — SODIUM CHLORIDE 9 MG/ML
1000 INJECTION, SOLUTION INTRAVENOUS
Refills: 0 | Status: DISCONTINUED | OUTPATIENT
Start: 2020-05-06 | End: 2020-05-07

## 2020-05-06 RX ORDER — HEPARIN SODIUM 5000 [USP'U]/ML
5000 INJECTION INTRAVENOUS; SUBCUTANEOUS EVERY 8 HOURS
Refills: 0 | Status: COMPLETED | OUTPATIENT
Start: 2020-05-06 | End: 2020-05-06

## 2020-05-06 RX ADMIN — MORPHINE SULFATE 2 MILLIGRAM(S): 50 CAPSULE, EXTENDED RELEASE ORAL at 13:42

## 2020-05-06 RX ADMIN — Medication 0.25 MILLIGRAM(S): at 03:09

## 2020-05-06 RX ADMIN — Medication 0.25 MILLIGRAM(S): at 16:50

## 2020-05-06 RX ADMIN — Medication 0.25 MILLIGRAM(S): at 12:04

## 2020-05-06 RX ADMIN — FINASTERIDE 5 MILLIGRAM(S): 5 TABLET, FILM COATED ORAL at 11:37

## 2020-05-06 RX ADMIN — SODIUM CHLORIDE 75 MILLILITER(S): 9 INJECTION, SOLUTION INTRAVENOUS at 06:49

## 2020-05-06 RX ADMIN — HEPARIN SODIUM 5000 UNIT(S): 5000 INJECTION INTRAVENOUS; SUBCUTANEOUS at 13:43

## 2020-05-06 RX ADMIN — MORPHINE SULFATE 2 MILLIGRAM(S): 50 CAPSULE, EXTENDED RELEASE ORAL at 02:28

## 2020-05-06 RX ADMIN — Medication 0.25 MILLIGRAM(S): at 04:59

## 2020-05-06 RX ADMIN — MORPHINE SULFATE 2 MILLIGRAM(S): 50 CAPSULE, EXTENDED RELEASE ORAL at 18:22

## 2020-05-06 RX ADMIN — HEPARIN SODIUM 5000 UNIT(S): 5000 INJECTION INTRAVENOUS; SUBCUTANEOUS at 23:01

## 2020-05-06 NOTE — PROGRESS NOTE ADULT - SUBJECTIVE AND OBJECTIVE BOX
Patient is a 85y old  Male who presents with a chief complaint of Destructive lytic lesion of left superior pubic ramus (06 May 2020 08:05)      FROM ADMISSION H+P:   HPI:  84 y/o male with PMHx Lung cancer with mets to bones (dx 8/2019) on home hospice, BPH, Dementia, Non-insulin dependent T2DM, hx TIA BIBA from home due to fall out of bed. Patient unable to give history due to dementia. History obtained from Pati Cuba ([phone]- cell). Pt fell on 5/1/20 out of bed and fell again on 5/4. States that home hospice was unable to have patient receive outpatient XR and was advised to go to hospital. Patient occasionally ambulates and has been bedridden for the last week. Patient has been taking Percocet and Ativan ATC for pain but due to dementia, daughter unable to gauge his pain level. States that patient had a dry cough about a month ago and occasionally takes albuterol inhaler when he appears sob. Denies fevers/chills, n/v, diarrhea, abdominal pain, recent travel or COVID exposure. Of note, patient has not had any treatment for metastatic lung cancer due to rapid progression and onset of dementia.    In the ED, patient's vitals were: /71, HR 77, RR 16 and SpO2 95% on room air. Significant labs include: wbc 10.51, H/H 10.3/33.1, MCV 76.4, Na 133.   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 No midline shift or hydrocephalus. Mildly motion limited study.  L XR hip/pelvis: Destructive lytic lesion involving the left superior pubic ramus. Sclerosis involving the left inferior pubic ramus. The findings are likely metastatic. Cannot rule out underlying pathologic fracture. Consider CT scan for additional diagnostic benefit.  Comminuted displaced angulated distal left femoral shaft fracture just above the femoral component of a bipolar knee prosthesis. The fracture may be pathologic.   CXR: Bilateral interstitial and airspace infiltrates suggestive of atypical pneumonia/viral pneumonia including COVID 19.   CT LLE and pelvis pending results  EKG: SR with APCs, HR 85 (05 May 2020 21:27)      ----  INTERVAL HPI/OVERNIGHT EVENTS: Pt seen and evaluated at the bedside. Patient is poor historian at baseline secondary to dementia.       ----  PAST MEDICAL & SURGICAL HISTORY:  BPH (benign prostatic hyperplasia)  TIA (transient ischemic attack)  Bone cancer due to spread from other site  Lung cancer  Dementia  Diabetes: not on insulin  H/O total knee replacement  History of cholecystectomy      FAMILY HISTORY:  No pertinent family history in first degree relatives      Allergies    No Known Allergies    Intolerances        ----  REVIEW OF SYSTEMS:  as per HPI    ----  PHYSICAL EXAM:  	General: elderly, frail male, lying in bed comfortably, NAD  	HEENT: NCAT, dry mucous membranes, Hard of hearing  	Neurology: A&Ox1 (person), unable to follow commands  	Respiratory: CTA bl, no rhonchi, no rales  	CV: RRR, +S1/S2, no murmurs, rubs or gallops  	Abdominal: Soft, NT, ND +BSx4  	Extremities: No C/C/E, + peripheral pulses  	MSK: no joint swelling   Skin: large ecchymosis of left lateral hip/thigh extending down to above knee    T(C): 36.5 (05-06-20 @ 07:35), Max: 36.7 (05-06-20 @ 02:00)  HR: 83 (05-06-20 @ 07:35) (77 - 100)  BP: 137/64 (05-06-20 @ 07:35) (102/59 - 145/71)  RR: 18 (05-06-20 @ 07:35) (16 - 18)  SpO2: 94% (05-06-20 @ 07:35) (94% - 95%)  Wt(kg): --    ----  I&O's Summary      LABS:                        10.2   11.23 )-----------( 393      ( 06 May 2020 05:06 )             33.1     05-06    135  |  100  |  14  ----------------------------<  121<H>  4.1   |  26  |  0.60    Ca    9.8      06 May 2020 05:06      PT/INR - ( 06 May 2020 05:06 )   PT: 11.5 sec;   INR: 1.03 ratio         PTT - ( 06 May 2020 05:06 )  PTT:24.4 sec    CAPILLARY BLOOD GLUCOSE      POCT Blood Glucose.: 92 mg/dL (06 May 2020 11:41)  POCT Blood Glucose.: 131 mg/dL (06 May 2020 06:52)                ----  Personally reviewed:  Vital sign trends: [  ] yes    [  ] no     [  ] n/a  Laboratory results: [  ] yes    [  ] no     [  ] n/a  Radiology results: [  ] yes    [  ] no     [  ] n/a  Culture results: [  ] yes    [  ] no     [  ] n/a  Consultant recommendations: [  ] yes    [  ] no     [  ] n/a

## 2020-05-06 NOTE — PROGRESS NOTE ADULT - PROBLEM SELECTOR PLAN 1
- s/p fall causing pathologic fracture   - XR showed Destructive lytic lesion involving the left superior pubic ramus. Sclerosis involving the left inferior pubic ramus.   - Ortho following (Dr. Silverman)- recs appreciated- for palliative surgery 5/7  - Pain management with morphine and percocet  - NPO after midnight  - fall risk  - miralax and senna while on opiates  - IVF  - Pt is intermediate risk for this moderate risk procedure. Considering palliative surgery, patient is medically optimized for surgery. RCRI score 1  - heparin 5000 q8, DVT ppx per ortho

## 2020-05-06 NOTE — PROGRESS NOTE ADULT - PROBLEM SELECTOR PLAN 10
Pt on home hospice  - Had discussion with daughter, Pati Cuba [phone] who is HCP  - Pt is DNR/DNI. Goal is for patient to be comfortable. MOLST completed and placed in chart.

## 2020-05-06 NOTE — ED ADULT NURSE REASSESSMENT NOTE - NS ED NURSE REASSESS COMMENT FT1
pt admitted to floor awaiting bed assignment
pt medicated with morphine 2mg ivp as ordered still remained agitated pt remained on safety watch awaiting
Patient changed for comfort and placed on fresh pad. Patient showing signs of being in severe pain, morphine IVP given. Patient to go to medical/surgical floor. Plan of care discussed with patient. Comfort and safety maintained. Will continue to monitor.
Report received, care assumed. Patient awake, alert and oriented. no complaints voiced at this time. Cardiac monitor in place. Patient confused. Left leg brace in place. Plan of care discussed with patient. Comfort and safety maintained. Will continue to monitor.

## 2020-05-06 NOTE — PROGRESS NOTE ADULT - ASSESSMENT
86 y/o male with PMHx Lung cancer with mets to bones, BPH, Dementia, Non-insulin dependent T2DM, Lung cancer, TIA BIBA from home due to fall out of bed. Found to have destructive lytic lesion involving the left superior pubic ramus, likely metastatic pathologic fracture.

## 2020-05-06 NOTE — PROGRESS NOTE ADULT - SUBJECTIVE AND OBJECTIVE BOX
Patient seen and examined at bedside. No acute events over night. Pt unable to relay acute complaints at this time 2/2 mental status.    PE:  Vital Signs Last 24 Hrs  T(C): 36.5 (05-06-20 @ 07:35), Max: 36.7 (05-06-20 @ 02:00)  T(F): 97.7 (05-06-20 @ 07:35), Max: 98 (05-06-20 @ 02:00)  HR: 83 (05-06-20 @ 07:35) (77 - 100)  BP: 137/64 (05-06-20 @ 07:35) (102/59 - 145/71)  BP(mean): --  RR: 18 (05-06-20 @ 07:35) (16 - 18)  SpO2: 94% (05-06-20 @ 07:35) (94% - 95%)    General: NAD, resting comfortably in bed  L LE:   LLE slightly short in bulky pérez knee immobilizer  compartments soft and compressible  Pt non compliant with exam this morning so unable to assess motor/sensation  DP/PT pulses palpable                        10.2   11.23 )-----------( 393      ( 06 May 2020 05:06 )             33.1     06 May 2020 05:06    135    |  100    |  14     ----------------------------<  121    4.1     |  26     |  0.60     Ca    9.8        06 May 2020 05:06      PT/INR - ( 06 May 2020 05:06 )   PT: 11.5 sec;   INR: 1.03 ratio         PTT - ( 06 May 2020 05:06 )  PTT:24.4 sec    A/P:  85y m w/ pathologic periprosthetic distal femur fracture  -PT/OT - NWB LLE  -Pain Control  -DVT ppx on hold  -NPO/IVF  -FU AM Labs  -Rest, ice, compress and elevate the extremity as we needed  -Incentive Spirometry  -Medical management appreciated  -Medical clearance appreciated  -Plan for OR 5/6 Patient seen and examined at bedside. No acute events over night. Pt unable to relay acute complaints at this time 2/2 mental status.    PE:  Vital Signs Last 24 Hrs  T(C): 36.5 (05-06-20 @ 07:35), Max: 36.7 (05-06-20 @ 02:00)  T(F): 97.7 (05-06-20 @ 07:35), Max: 98 (05-06-20 @ 02:00)  HR: 83 (05-06-20 @ 07:35) (77 - 100)  BP: 137/64 (05-06-20 @ 07:35) (102/59 - 145/71)  BP(mean): --  RR: 18 (05-06-20 @ 07:35) (16 - 18)  SpO2: 94% (05-06-20 @ 07:35) (94% - 95%)    General: NAD, resting comfortably in bed  L LE:   LLE slightly short in bulky pérez knee immobilizer  compartments soft and compressible  Pt non compliant with exam this morning so unable to assess motor/sensation  DP/PT pulses palpable                        10.2   11.23 )-----------( 393      ( 06 May 2020 05:06 )             33.1     06 May 2020 05:06    135    |  100    |  14     ----------------------------<  121    4.1     |  26     |  0.60     Ca    9.8        06 May 2020 05:06      PT/INR - ( 06 May 2020 05:06 )   PT: 11.5 sec;   INR: 1.03 ratio         PTT - ( 06 May 2020 05:06 )  PTT:24.4 sec    A/P:  85y m w/ pathologic periprosthetic distal femur fracture  -PT/OT - NWB LLE  -Pain Control  -DVT ppx on hold  -NPO/IVF  -FU AM Labs  -Rest, ice, compress and elevate the extremity as we needed  -Incentive Spirometry  -Medical management appreciated  -Medical clearance appreciated  -Plan for OR 5/7

## 2020-05-06 NOTE — PROGRESS NOTE ADULT - PROBLEM SELECTOR PLAN 4
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 Mauri White at [facility]  - Pt on home hospice   - oxygen supplementation prn

## 2020-05-06 NOTE — PROGRESS NOTE ADULT - PROBLEM SELECTOR PLAN 7
Chronic, stable  - reorient as needed  - Caution with [facility]ERs medications  - poor PO intake, d/w speech and swallow home hospice patient, no eval inpatient Chronic, stable  - reorient as needed  - Caution with BEERs medications  - poor PO intake, d/w daughter takes regular diabetic diet at home with kasandra bailey- d/w speech/swallow not candidate for eval as he is home hospice patient

## 2020-05-07 LAB
ALBUMIN SERPL ELPH-MCNC: 2.6 G/DL — LOW (ref 3.3–5)
ALP SERPL-CCNC: 186 U/L — HIGH (ref 40–120)
ALT FLD-CCNC: 21 U/L — SIGNIFICANT CHANGE UP (ref 12–78)
ANION GAP SERPL CALC-SCNC: 6 MMOL/L — SIGNIFICANT CHANGE UP (ref 5–17)
ANION GAP SERPL CALC-SCNC: 8 MMOL/L — SIGNIFICANT CHANGE UP (ref 5–17)
ANION GAP SERPL CALC-SCNC: 9 MMOL/L — SIGNIFICANT CHANGE UP (ref 5–17)
APTT BLD: 30.3 SEC — SIGNIFICANT CHANGE UP (ref 28.5–37)
AST SERPL-CCNC: 22 U/L — SIGNIFICANT CHANGE UP (ref 15–37)
BASOPHILS # BLD AUTO: 0.05 K/UL — SIGNIFICANT CHANGE UP (ref 0–0.2)
BASOPHILS NFR BLD AUTO: 0.6 % — SIGNIFICANT CHANGE UP (ref 0–2)
BILIRUB SERPL-MCNC: 0.6 MG/DL — SIGNIFICANT CHANGE UP (ref 0.2–1.2)
BUN SERPL-MCNC: 12 MG/DL — SIGNIFICANT CHANGE UP (ref 7–23)
BUN SERPL-MCNC: 13 MG/DL — SIGNIFICANT CHANGE UP (ref 7–23)
BUN SERPL-MCNC: 16 MG/DL — SIGNIFICANT CHANGE UP (ref 7–23)
CALCIUM SERPL-MCNC: 9 MG/DL — SIGNIFICANT CHANGE UP (ref 8.5–10.1)
CALCIUM SERPL-MCNC: 9.5 MG/DL — SIGNIFICANT CHANGE UP (ref 8.5–10.1)
CALCIUM SERPL-MCNC: 9.6 MG/DL — SIGNIFICANT CHANGE UP (ref 8.5–10.1)
CHLORIDE SERPL-SCNC: 101 MMOL/L — SIGNIFICANT CHANGE UP (ref 96–108)
CHLORIDE SERPL-SCNC: 101 MMOL/L — SIGNIFICANT CHANGE UP (ref 96–108)
CHLORIDE SERPL-SCNC: 102 MMOL/L — SIGNIFICANT CHANGE UP (ref 96–108)
CO2 SERPL-SCNC: 26 MMOL/L — SIGNIFICANT CHANGE UP (ref 22–31)
CO2 SERPL-SCNC: 27 MMOL/L — SIGNIFICANT CHANGE UP (ref 22–31)
CO2 SERPL-SCNC: 28 MMOL/L — SIGNIFICANT CHANGE UP (ref 22–31)
CREAT SERPL-MCNC: 0.47 MG/DL — LOW (ref 0.5–1.3)
CREAT SERPL-MCNC: 0.47 MG/DL — LOW (ref 0.5–1.3)
CREAT SERPL-MCNC: 0.68 MG/DL — SIGNIFICANT CHANGE UP (ref 0.5–1.3)
EOSINOPHIL # BLD AUTO: 0.11 K/UL — SIGNIFICANT CHANGE UP (ref 0–0.5)
EOSINOPHIL NFR BLD AUTO: 1.2 % — SIGNIFICANT CHANGE UP (ref 0–6)
GLUCOSE SERPL-MCNC: 106 MG/DL — HIGH (ref 70–99)
GLUCOSE SERPL-MCNC: 80 MG/DL — SIGNIFICANT CHANGE UP (ref 70–99)
GLUCOSE SERPL-MCNC: 86 MG/DL — SIGNIFICANT CHANGE UP (ref 70–99)
HCT VFR BLD CALC: 30.6 % — LOW (ref 39–50)
HCT VFR BLD CALC: 32 % — LOW (ref 39–50)
HCT VFR BLD CALC: 34 % — LOW (ref 39–50)
HGB BLD-MCNC: 10.7 G/DL — LOW (ref 13–17)
HGB BLD-MCNC: 9.4 G/DL — LOW (ref 13–17)
HGB BLD-MCNC: 9.9 G/DL — LOW (ref 13–17)
IMM GRANULOCYTES NFR BLD AUTO: 1.7 % — HIGH (ref 0–1.5)
INR BLD: 1.09 RATIO — SIGNIFICANT CHANGE UP (ref 0.88–1.16)
LYMPHOCYTES # BLD AUTO: 1.33 K/UL — SIGNIFICANT CHANGE UP (ref 1–3.3)
LYMPHOCYTES # BLD AUTO: 15 % — SIGNIFICANT CHANGE UP (ref 13–44)
MCHC RBC-ENTMCNC: 23.9 PG — LOW (ref 27–34)
MCHC RBC-ENTMCNC: 30.7 GM/DL — LOW (ref 32–36)
MCHC RBC-ENTMCNC: 30.9 GM/DL — LOW (ref 32–36)
MCHC RBC-ENTMCNC: 31.5 GM/DL — LOW (ref 32–36)
MCV RBC AUTO: 75.9 FL — LOW (ref 80–100)
MCV RBC AUTO: 77.1 FL — LOW (ref 80–100)
MCV RBC AUTO: 77.9 FL — LOW (ref 80–100)
MONOCYTES # BLD AUTO: 1.09 K/UL — HIGH (ref 0–0.9)
MONOCYTES NFR BLD AUTO: 12.3 % — SIGNIFICANT CHANGE UP (ref 2–14)
NEUTROPHILS # BLD AUTO: 6.16 K/UL — SIGNIFICANT CHANGE UP (ref 1.8–7.4)
NEUTROPHILS NFR BLD AUTO: 69.2 % — SIGNIFICANT CHANGE UP (ref 43–77)
NRBC # BLD: 0 /100 WBCS — SIGNIFICANT CHANGE UP (ref 0–0)
PLATELET # BLD AUTO: 340 K/UL — SIGNIFICANT CHANGE UP (ref 150–400)
PLATELET # BLD AUTO: 346 K/UL — SIGNIFICANT CHANGE UP (ref 150–400)
PLATELET # BLD AUTO: 371 K/UL — SIGNIFICANT CHANGE UP (ref 150–400)
POTASSIUM SERPL-MCNC: 4 MMOL/L — SIGNIFICANT CHANGE UP (ref 3.5–5.3)
POTASSIUM SERPL-MCNC: 4 MMOL/L — SIGNIFICANT CHANGE UP (ref 3.5–5.3)
POTASSIUM SERPL-MCNC: 4.5 MMOL/L — SIGNIFICANT CHANGE UP (ref 3.5–5.3)
POTASSIUM SERPL-SCNC: 4 MMOL/L — SIGNIFICANT CHANGE UP (ref 3.5–5.3)
POTASSIUM SERPL-SCNC: 4 MMOL/L — SIGNIFICANT CHANGE UP (ref 3.5–5.3)
POTASSIUM SERPL-SCNC: 4.5 MMOL/L — SIGNIFICANT CHANGE UP (ref 3.5–5.3)
PROT SERPL-MCNC: 6.6 G/DL — SIGNIFICANT CHANGE UP (ref 6–8.3)
PROTHROM AB SERPL-ACNC: 12.2 SEC — SIGNIFICANT CHANGE UP (ref 10–12.9)
RBC # BLD: 3.93 M/UL — LOW (ref 4.2–5.8)
RBC # BLD: 4.15 M/UL — LOW (ref 4.2–5.8)
RBC # BLD: 4.48 M/UL — SIGNIFICANT CHANGE UP (ref 4.2–5.8)
RBC # FLD: 17.2 % — HIGH (ref 10.3–14.5)
RBC # FLD: 17.2 % — HIGH (ref 10.3–14.5)
RBC # FLD: 17.3 % — HIGH (ref 10.3–14.5)
SODIUM SERPL-SCNC: 135 MMOL/L — SIGNIFICANT CHANGE UP (ref 135–145)
SODIUM SERPL-SCNC: 136 MMOL/L — SIGNIFICANT CHANGE UP (ref 135–145)
SODIUM SERPL-SCNC: 137 MMOL/L — SIGNIFICANT CHANGE UP (ref 135–145)
WBC # BLD: 12.82 K/UL — HIGH (ref 3.8–10.5)
WBC # BLD: 8.61 K/UL — SIGNIFICANT CHANGE UP (ref 3.8–10.5)
WBC # BLD: 8.89 K/UL — SIGNIFICANT CHANGE UP (ref 3.8–10.5)
WBC # FLD AUTO: 12.82 K/UL — HIGH (ref 3.8–10.5)
WBC # FLD AUTO: 8.61 K/UL — SIGNIFICANT CHANGE UP (ref 3.8–10.5)
WBC # FLD AUTO: 8.89 K/UL — SIGNIFICANT CHANGE UP (ref 3.8–10.5)

## 2020-05-07 PROCEDURE — 99232 SBSQ HOSP IP/OBS MODERATE 35: CPT

## 2020-05-07 RX ORDER — TAMSULOSIN HYDROCHLORIDE 0.4 MG/1
0.4 CAPSULE ORAL AT BEDTIME
Refills: 0 | Status: DISCONTINUED | OUTPATIENT
Start: 2020-05-08 | End: 2020-05-11

## 2020-05-07 RX ORDER — SODIUM CHLORIDE 9 MG/ML
1000 INJECTION, SOLUTION INTRAVENOUS
Refills: 0 | Status: DISCONTINUED | OUTPATIENT
Start: 2020-05-07 | End: 2020-05-11

## 2020-05-07 RX ORDER — GLUCAGON INJECTION, SOLUTION 0.5 MG/.1ML
1 INJECTION, SOLUTION SUBCUTANEOUS ONCE
Refills: 0 | Status: DISCONTINUED | OUTPATIENT
Start: 2020-05-07 | End: 2020-05-11

## 2020-05-07 RX ORDER — ACETAMINOPHEN 500 MG
1000 TABLET ORAL ONCE
Refills: 0 | Status: COMPLETED | OUTPATIENT
Start: 2020-05-07 | End: 2020-05-07

## 2020-05-07 RX ORDER — SODIUM CHLORIDE 9 MG/ML
1000 INJECTION, SOLUTION INTRAVENOUS
Refills: 0 | Status: DISCONTINUED | OUTPATIENT
Start: 2020-05-07 | End: 2020-05-07

## 2020-05-07 RX ORDER — POLYETHYLENE GLYCOL 3350 17 G/17G
17 POWDER, FOR SOLUTION ORAL DAILY
Refills: 0 | Status: DISCONTINUED | OUTPATIENT
Start: 2020-05-07 | End: 2020-05-11

## 2020-05-07 RX ORDER — OXYCODONE HYDROCHLORIDE 5 MG/1
5 TABLET ORAL EVERY 4 HOURS
Refills: 0 | Status: DISCONTINUED | OUTPATIENT
Start: 2020-05-07 | End: 2020-05-11

## 2020-05-07 RX ORDER — INSULIN LISPRO 100/ML
VIAL (ML) SUBCUTANEOUS
Refills: 0 | Status: DISCONTINUED | OUTPATIENT
Start: 2020-05-07 | End: 2020-05-11

## 2020-05-07 RX ORDER — ONDANSETRON 8 MG/1
4 TABLET, FILM COATED ORAL ONCE
Refills: 0 | Status: DISCONTINUED | OUTPATIENT
Start: 2020-05-07 | End: 2020-05-07

## 2020-05-07 RX ORDER — DEXTROSE 50 % IN WATER 50 %
25 SYRINGE (ML) INTRAVENOUS ONCE
Refills: 0 | Status: DISCONTINUED | OUTPATIENT
Start: 2020-05-07 | End: 2020-05-11

## 2020-05-07 RX ORDER — HYDROMORPHONE HYDROCHLORIDE 2 MG/ML
0.2 INJECTION INTRAMUSCULAR; INTRAVENOUS; SUBCUTANEOUS
Refills: 0 | Status: DISCONTINUED | OUTPATIENT
Start: 2020-05-07 | End: 2020-05-07

## 2020-05-07 RX ORDER — SENNA PLUS 8.6 MG/1
2 TABLET ORAL AT BEDTIME
Refills: 0 | Status: DISCONTINUED | OUTPATIENT
Start: 2020-05-07 | End: 2020-05-11

## 2020-05-07 RX ORDER — ONDANSETRON 8 MG/1
4 TABLET, FILM COATED ORAL EVERY 6 HOURS
Refills: 0 | Status: DISCONTINUED | OUTPATIENT
Start: 2020-05-07 | End: 2020-05-11

## 2020-05-07 RX ORDER — ACETAMINOPHEN 500 MG
650 TABLET ORAL EVERY 6 HOURS
Refills: 0 | Status: DISCONTINUED | OUTPATIENT
Start: 2020-05-07 | End: 2020-05-11

## 2020-05-07 RX ORDER — ENOXAPARIN SODIUM 100 MG/ML
40 INJECTION SUBCUTANEOUS EVERY 24 HOURS
Refills: 0 | Status: DISCONTINUED | OUTPATIENT
Start: 2020-05-08 | End: 2020-05-11

## 2020-05-07 RX ORDER — ASCORBIC ACID 60 MG
500 TABLET,CHEWABLE ORAL
Refills: 0 | Status: DISCONTINUED | OUTPATIENT
Start: 2020-05-07 | End: 2020-05-11

## 2020-05-07 RX ORDER — DEXTROSE 50 % IN WATER 50 %
12.5 SYRINGE (ML) INTRAVENOUS ONCE
Refills: 0 | Status: DISCONTINUED | OUTPATIENT
Start: 2020-05-07 | End: 2020-05-11

## 2020-05-07 RX ORDER — CEFAZOLIN SODIUM 1 G
2000 VIAL (EA) INJECTION ONCE
Refills: 0 | Status: COMPLETED | OUTPATIENT
Start: 2020-05-07 | End: 2020-05-07

## 2020-05-07 RX ORDER — PANTOPRAZOLE SODIUM 20 MG/1
40 TABLET, DELAYED RELEASE ORAL
Refills: 0 | Status: DISCONTINUED | OUTPATIENT
Start: 2020-05-07 | End: 2020-05-11

## 2020-05-07 RX ORDER — INSULIN LISPRO 100/ML
VIAL (ML) SUBCUTANEOUS AT BEDTIME
Refills: 0 | Status: DISCONTINUED | OUTPATIENT
Start: 2020-05-07 | End: 2020-05-11

## 2020-05-07 RX ORDER — FINASTERIDE 5 MG/1
5 TABLET, FILM COATED ORAL DAILY
Refills: 0 | Status: DISCONTINUED | OUTPATIENT
Start: 2020-05-08 | End: 2020-05-11

## 2020-05-07 RX ORDER — DEXTROSE 50 % IN WATER 50 %
15 SYRINGE (ML) INTRAVENOUS ONCE
Refills: 0 | Status: DISCONTINUED | OUTPATIENT
Start: 2020-05-07 | End: 2020-05-11

## 2020-05-07 RX ORDER — OXYCODONE HYDROCHLORIDE 5 MG/1
10 TABLET ORAL EVERY 4 HOURS
Refills: 0 | Status: DISCONTINUED | OUTPATIENT
Start: 2020-05-07 | End: 2020-05-11

## 2020-05-07 RX ORDER — FOLIC ACID 0.8 MG
1 TABLET ORAL DAILY
Refills: 0 | Status: DISCONTINUED | OUTPATIENT
Start: 2020-05-07 | End: 2020-05-11

## 2020-05-07 RX ORDER — CEFAZOLIN SODIUM 1 G
2000 VIAL (EA) INJECTION EVERY 8 HOURS
Refills: 0 | Status: COMPLETED | OUTPATIENT
Start: 2020-05-07 | End: 2020-05-08

## 2020-05-07 RX ADMIN — MORPHINE SULFATE 2 MILLIGRAM(S): 50 CAPSULE, EXTENDED RELEASE ORAL at 07:49

## 2020-05-07 RX ADMIN — Medication 100 MILLIGRAM(S): at 23:30

## 2020-05-07 RX ADMIN — MORPHINE SULFATE 2 MILLIGRAM(S): 50 CAPSULE, EXTENDED RELEASE ORAL at 01:03

## 2020-05-07 RX ADMIN — HYDROMORPHONE HYDROCHLORIDE 0.2 MILLIGRAM(S): 2 INJECTION INTRAMUSCULAR; INTRAVENOUS; SUBCUTANEOUS at 19:21

## 2020-05-07 RX ADMIN — Medication 400 MILLIGRAM(S): at 19:03

## 2020-05-07 RX ADMIN — Medication 100 MILLIGRAM(S): at 15:49

## 2020-05-07 RX ADMIN — SODIUM CHLORIDE 75 MILLILITER(S): 9 INJECTION, SOLUTION INTRAVENOUS at 19:03

## 2020-05-07 NOTE — DISCHARGE NOTE PROVIDER - HOSPITAL COURSE
84 y/o male with PMHx Lung cancer with mets to bones (dx 8/2019) on home hospice, BPH, Dementia, Non-insulin dependent T2DM, hx TIA BIBA from home due to fall out of bed. Patient is poor historian due to dementia. History obtained from daughter Pati Cuba ([phone]- cell). Pt fell on 5/1/20 out of bed and fell again on 5/4. States that home hospice was unable to have patient receive outpatient XR and was advised to go to hospital. Patient occasionally ambulates and had been bedridden for the week prior to admission. He had a dry cough about a month ago and occasionally takes albuterol inhaler when he appears sob. No fevers/chills, n/v, diarrhea, abdominal pain, recent travel or COVID exposure. Of note, patient has not had any treatment for metastatic lung cancer due to rapid progression and onset of dementia; on home hospice. 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 Left XR hip/pelvis showed destructive lytic lesion involving the left superior pubic ramus; sclerosis involving the left inferior pubic ramus; findings likely metastatic.     Comminuted displaced angulated distal left femoral shaft fracture just above the femoral component of a bipolar knee prosthesis. The fracture may be pathologic.     CXR showed bilateral interstitial and airspace infiltrates suggestive of atypical pneumonia/viral pneumonia including COVID 19. 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 Considering the extensive extraosseous soft tissue densities and clusters of fine calcifications, the possibility of pathologic fracture of distal femur cannot be excluded.    He was evaluated by orthopedic surgery and after discussion with daughter, decision was made for palliative surgery. He underwent ORIF left distal femur on 5/7/20, post op stable. He was also evaluated by pulmonology; imaging and inflammatory markers could be from chronic illness lung CA and pathologic fracture but unable to rule out COVID 19 with certainty.         Evaluated by PT who recommended....    Patient to continue home hospice upon discharge.                 ---    CONSULTANTS:     Orthopedic surgery- Dr Silverman    Pulmonology- Dr Davila    ---    TIME SPENT:    The total amount of time spent reviewing the hospital notes, laboratory values, imaging findings, assessing/counseling the patient, discussing with consultant physicians, social work, nursing staff was -- minutes 86 y/o male with PMHx Lung cancer with mets to bones (dx 8/2019) on home hospice, BPH, Dementia, Non-insulin dependent T2DM, hx TIA BIBA from home due to fall out of bed. Patient is poor historian due to dementia. History obtained from daughter Pati Cuba ([phone]- cell). Pt fell on 5/1/20 out of bed and fell again on 5/4. States that home hospice was unable to have patient receive outpatient XR and was advised to go to hospital. Patient occasionally ambulates and had been bedridden for the week prior to admission. He had a dry cough about a month ago and occasionally takes albuterol inhaler when he appears sob. No fevers/chills, n/v, diarrhea, abdominal pain, recent travel or COVID exposure. Of note, patient has not had any treatment for metastatic lung cancer due to rapid progression and onset of dementia; on home hospice. 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 Left XR hip/pelvis showed destructive lytic lesion involving the left superior pubic ramus; sclerosis involving the left inferior pubic ramus; findings likely metastatic.     Comminuted displaced angulated distal left femoral shaft fracture just above the femoral component of a bipolar knee prosthesis. The fracture may be pathologic.     CXR showed bilateral interstitial and airspace infiltrates suggestive of atypical pneumonia/viral pneumonia including COVID 19. 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 Considering the extensive extraosseous soft tissue densities and clusters of fine calcifications, the possibility of pathologic fracture of distal femur cannot be excluded.    He was evaluated by orthopedic surgery and after discussion with daughter, decision was made for palliative surgery. He underwent ORIF left distal femur on 5/7/20, post op stable. Will need lovenox 40mg qub q daily for 1 month for dvt ppx. He was also evaluated by pulmonology; imaging and inflammatory markers could be from chronic illness lung CA and pathologic fracture but unable to rule out COVID 19 with certainty as CXR with findings typical of covid despite a negative PCR. Patient will require home O2, 2L via [place].         Evaluated by PT who recommended home with assist.     Patient to continue home hospice upon discharge.                 ---    CONSULTANTS:     Orthopedic surgery- Dr Silverman    Pulmonology- Dr Davila    ---    TIME SPENT:    The total amount of time spent reviewing the hospital notes, laboratory values, imaging findings, assessing/counseling the patient, discussing with consultant physicians, social work, nursing staff was 37 minutes 86 y/o male with PMHx Lung cancer with mets to bones (dx 8/2019) on home hospice, BPH, Dementia, Non-insulin dependent T2DM, hx TIA BIBA from home due to fall out of bed. Patient is poor historian due to dementia. History obtained from daughter Pati Cuba ([phone]- cell). Pt fell on 5/1/20 out of bed and fell again on 5/4. States that home hospice was unable to have patient receive outpatient XR and was advised to go to hospital. Patient occasionally ambulates and had been bedridden for the week prior to admission. He had a dry cough about a month ago and occasionally takes albuterol inhaler when he appears sob. No fevers/chills, n/v, diarrhea, abdominal pain, recent travel or COVID exposure. Of note, patient has not had any treatment for metastatic lung cancer due to rapid progression and onset of dementia; on home hospice. 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 Left XR hip/pelvis showed destructive lytic lesion involving the left superior pubic ramus; sclerosis involving the left inferior pubic ramus; findings likely metastatic.     Comminuted displaced angulated distal left femoral shaft fracture just above the femoral component of a bipolar knee prosthesis. The fracture may be pathologic.     CXR showed bilateral interstitial and airspace infiltrates suggestive of atypical pneumonia/viral pneumonia including COVID 19. 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 Considering the extensive extraosseous soft tissue densities and clusters of fine calcifications, the possibility of pathologic fracture of distal femur cannot be excluded.    He was evaluated by orthopedic surgery and after discussion with daughter, decision was made for palliative surgery. He underwent ORIF left distal femur on 5/7/20, post op stable. Will need lovenox 40mg qub q daily for 1 month for dvt ppx. He was also evaluated by pulmonology; imaging and inflammatory markers could be from chronic illness lung CA and pathologic fracture but unable to rule out COVID 19 with certainty as CXR with findings typical of covid despite a negative PCR. Patient will require home O2, 2L via NC.         Evaluated by PT who recommended home with assist.     Patient to continue home hospice upon discharge.                 ---    CONSULTANTS:     Orthopedic surgery- Dr Silverman    Pulmonology- Dr Davila    ---    TIME SPENT:    The total amount of time spent reviewing the hospital notes, laboratory values, imaging findings, assessing/counseling the patient, discussing with consultant physicians, social work, nursing staff was 37 minutes    PHYSICAL EXAM        Constitutional: NAD, well-groomed, well-developed    HEENT: PERRLA, EOMI, Normal Hearing, MMM    Neck: No LAD, No JVD    Back: Normal spine flexure, No CVA tenderness    Respiratory: CTAB/L     Cardiovascular: S1 and S2, RRR, no M/G/R    Gastrointestinal: BS+, soft, NT/ND    Extremities: No peripheral edema    Vascular: 2+ peripheral pulses    Neurological: A/O x 2, no focal deficits    Skin: No rashes 84 y/o male with PMHx Lung cancer with mets to bones (dx 8/2019) on home hospice, BPH, Dementia, Non-insulin dependent T2DM, hx TIA BIBA from home due to fall out of bed. Patient is poor historian due to dementia. History obtained from daughter Pati Cuba ([phone]- cell). Pt fell on 5/1/20 out of bed and fell again on 5/4. States that home hospice was unable to have patient receive outpatient XR and was advised to go to hospital. Patient occasionally ambulates and had been bedridden for the week prior to admission. He had a dry cough about a month ago and occasionally takes albuterol inhaler when he appears sob. No fevers/chills, n/v, diarrhea, abdominal pain, recent travel or COVID exposure. Of note, patient has not had any treatment for metastatic lung cancer due to rapid progression and onset of dementia; on home hospice. 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 Left XR hip/pelvis showed destructive lytic lesion involving the left superior pubic ramus; sclerosis involving the left inferior pubic ramus; findings likely metastatic.     Comminuted displaced angulated distal left femoral shaft fracture just above the femoral component of a bipolar knee prosthesis. The fracture may be pathologic.     CXR showed bilateral interstitial and airspace infiltrates suggestive of atypical pneumonia/viral pneumonia including COVID 19. 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 Considering the extensive extraosseous soft tissue densities and clusters of fine calcifications, the possibility of pathologic fracture of distal femur cannot be excluded.    He was evaluated by orthopedic surgery and after discussion with daughter, decision was made for palliative surgery. He underwent ORIF left distal femur on 5/7/20, post op stable. Will need lovenox 40mg qub q daily for 1 month for dvt ppx. He was also evaluated by pulmonology; imaging and inflammatory markers could be from chronic illness lung CA and pathologic fracture but unable to rule out COVID 19 with certainty as CXR with findings typical of covid despite a negative PCR. Patient will require home O2, 2L via NC.         Evaluated by PT who recommended home with assist.     Patient to continue home hospice upon discharge.                 ---    CONSULTANTS:     Orthopedic surgery- Dr Silverman    Pulmonology- Dr Davila    ---    TIME SPENT:    The total amount of time spent reviewing the hospital notes, laboratory values, imaging findings, assessing/counseling the patient, discussing with consultant physicians, social work, nursing staff was 37 minutes    PHYSICAL EXAM        Constitutional: NAD, on room air     HEENT: PERRLA, EOMI, Normal Hearing, MMM    Neck: No LAD, No JVD    Back: Normal spine flexure, No CVA tenderness    Respiratory: CTAB/L     Cardiovascular: S1 and S2, RRR, no M/G/R    Gastrointestinal: BS+, soft, NT/ND    Extremities: left leg splint clean dry and intact, no peripheral edema    Vascular: 2+ peripheral pulses    Neurological: A/O x 2, no focal deficits    Skin: No rashes

## 2020-05-07 NOTE — PROGRESS NOTE ADULT - PROBLEM SELECTOR PLAN 9
IMPROVE VTE Individual Risk Assessment          RISK                                                          Points  [  ] Previous VTE                                                3  [  ] Thrombophilia                                             2  [ x ] Lower limb paralysis                                   2        (unable to hold up >15 seconds)    [x  ] Current Cancer                                             2         (within 6 months)  [x  ] Immobilization > 24 hrs                              1  [  ] ICU/CCU stay > 24 hours                             1  [x  ] Age > 60                                                         1    IMPROVE VTE Score: 6  DVT ppx: Heparin sq, held pre operatively

## 2020-05-07 NOTE — DISCHARGE NOTE PROVIDER - PROVIDER TOKENS
PROVIDER:[TOKEN:[[id number]]] PROVIDER:[TOKEN:[[id number]]],FREE:[LAST:[Adrián],FIRST:[Virgil],PHONE:[(   )    -],FAX:[(   )    -],ADDRESS:[PCP]],PROVIDER:[TOKEN:[[id number]]] PROVIDER:[TOKEN:[[id number]],FOLLOWUP:[1 week]],PROVIDER:[TOKEN:[[id number]],FOLLOWUP:[1 week]],FREE:[LAST:[Adrián],FIRST:[Virgil],PHONE:[(   )    -],FAX:[(   )    -],ADDRESS:[PCP],FOLLOWUP:[1 week],ESTABLISHEDPATIENT:[T]]

## 2020-05-07 NOTE — DISCHARGE NOTE PROVIDER - NSDCFUADDINST_GEN_ALL_CORE_FT
1.	Pain Control  2.	Non-weight bearing  left lower extremity in knee immobilizer, with assistive devices as needed.  3.	DVT Prophylaxis  4.	PT as needed  5.	Follow up with Dr. Silverman as outpatient in 10-14 days after discharge from the hospital or rehab. Call office for appointment.   6.	Staple/Suture removal and repeat x-rays on Post-Op Day 14.  7.	Ice/Elevate affected area as needed  8.	Keep Dressing/Immobilizer Clean and dry.

## 2020-05-07 NOTE — DISCHARGE NOTE PROVIDER - NSDCMRMEDTOKEN_GEN_ALL_CORE_FT
finasteride 5 mg oral tablet: 1 tab(s) orally once a day  Flomax 0.4 mg oral capsule: 1 cap(s) orally once a day  LORazepam: 0.25 milligram(s) orally every 6 hours, As Needed  metFORMIN 500 mg oral tablet: 1 tab(s) orally 2 times a day  Percocet 10/325 oral tablet: orally every 4 hours, As Needed finasteride 5 mg oral tablet: 1 tab(s) orally once a day  Flomax 0.4 mg oral capsule: 1 cap(s) orally once a day  LORazepam: 0.25 milligram(s) orally every 6 hours, As Needed  metFORMIN 500 mg oral tablet: 1 tab(s) orally 2 times a day  Percocet 10/325 oral tablet: orally every 4 hours, As Needed  senna oral tablet: 2 tab(s) orally once a day (at bedtime), As needed, Constipation

## 2020-05-07 NOTE — PROGRESS NOTE ADULT - SUBJECTIVE AND OBJECTIVE BOX
Pt S/E at bedside, no acute events overnight, pain controlled    Vital Signs Last 24 Hrs  T(C): 36.9 (07 May 2020 04:11), Max: 36.9 (06 May 2020 22:11)  T(F): 98.4 (07 May 2020 04:11), Max: 98.5 (06 May 2020 22:11)  HR: 81 (07 May 2020 04:11) (80 - 90)  BP: 168/64 (07 May 2020 04:11) (112/67 - 171/62)  BP(mean): --  RR: 18 (07 May 2020 04:11) (18 - 22)  SpO2: 89% (07 May 2020 04:11) (89% - 95%)    Gen: NAD, lying comfortably in bed, confused, not following commands    Left Lower Extremity:  skin intact, compartments soft compressible  moves foot to stimulation  responds to tactile stimulus  +DP/PT Pulses  No calf TTP B/L

## 2020-05-07 NOTE — PROGRESS NOTE ADULT - SUBJECTIVE AND OBJECTIVE BOX
Patient is a 85y old  Male who presents with a chief complaint of Destructive lytic lesion of left superior pubic ramus (06 May 2020 08:05)      FROM ADMISSION H+P:   HPI:  84 y/o male with PMHx Lung cancer with mets to bones (dx 8/2019) on home hospice, BPH, Dementia, Non-insulin dependent T2DM, hx TIA BIBA from home due to fall out of bed. Patient unable to give history due to dementia. History obtained from Pati Cuba ([phone]- cell). Pt fell on 5/1/20 out of bed and fell again on 5/4. States that home hospice was unable to have patient receive outpatient XR and was advised to go to hospital. Patient occasionally ambulates and has been bedridden for the last week. Patient has been taking Percocet and Ativan ATC for pain but due to dementia, daughter unable to gauge his pain level. States that patient had a dry cough about a month ago and occasionally takes albuterol inhaler when he appears sob. Denies fevers/chills, n/v, diarrhea, abdominal pain, recent travel or COVID exposure. Of note, patient has not had any treatment for metastatic lung cancer due to rapid progression and onset of dementia.    In the ED, patient's vitals were: /71, HR 77, RR 16 and SpO2 95% on room air. Significant labs include: wbc 10.51, H/H 10.3/33.1, MCV 76.4, Na 133.   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 No midline shift or hydrocephalus. Mildly motion limited study.  L XR hip/pelvis: Destructive lytic lesion involving the left superior pubic ramus. Sclerosis involving the left inferior pubic ramus. The findings are likely metastatic. Cannot rule out underlying pathologic fracture. Consider CT scan for additional diagnostic benefit.  Comminuted displaced angulated distal left femoral shaft fracture just above the femoral component of a bipolar knee prosthesis. The fracture may be pathologic.   CXR: Bilateral interstitial and airspace infiltrates suggestive of atypical pneumonia/viral pneumonia including COVID 19.   CT LLE and pelvis pending results  EKG: SR with APCs, HR 85 (05 May 2020 21:27)      ----  INTERVAL HPI/OVERNIGHT EVENTS: Pt seen and evaluated at the bedside. Patient is poor historian at baseline secondary to dementia.       ----  PAST MEDICAL & SURGICAL HISTORY:  BPH (benign prostatic hyperplasia)  TIA (transient ischemic attack)  Bone cancer due to spread from other site  Lung cancer  Dementia  Diabetes: not on insulin  H/O total knee replacement  History of cholecystectomy      FAMILY HISTORY:  No pertinent family history in first degree relatives      Allergies    No Known Allergies    Intolerances        ----  REVIEW OF SYSTEMS:  as per HPI    ----  PHYSICAL EXAM:  	General: elderly, frail male, lying in bed comfortably, NAD  	HEENT: NCAT, dry mucous membranes, Hard of hearing  	Neurology: A&Ox1 (person), unable to follow commands  	Respiratory: CTA bl, decreased breath sounds, no rhonchi, no rales  	CV: RRR, +S1/S2, no murmurs, rubs or gallops  	Abdominal: Soft, NT, ND +BSx4  	Extremities: No C/C/E, + peripheral pulses  	MSK: no joint swelling   Skin: large ecchymosis of left lateral hip/thigh extending down to above knee    Vital Signs Last 24 Hrs  T(C): 36.9 (07 May 2020 04:11), Max: 36.9 (06 May 2020 22:11)  T(F): 98.4 (07 May 2020 04:11), Max: 98.5 (06 May 2020 22:11)  HR: 81 (07 May 2020 09:21) (80 - 90)  BP: 141/- (07 May 2020 09:21) (112/67 - 171/62)  BP(mean): --  RR: 18 (07 May 2020 04:11) (18 - 22)  SpO2: 93% (07 May 2020 09:21) (89% - 95%)    ----  I&O's Summary      LABS:                                   9.9    8.89  )-----------( 346      ( 07 May 2020 08:03 )             32.0     07 May 2020 08:03    137    |  101    |  12     ----------------------------<  80     4.0     |  27     |  0.47     Ca    9.6        07 May 2020 08:03    TPro  6.6    /  Alb  2.6    /  TBili  0.6    /  DBili  x      /  AST  22     /  ALT  21     /  AlkPhos  186    07 May 2020 08:03    LIVER FUNCTIONS - ( 07 May 2020 08:03 )  Alb: 2.6 g/dL / Pro: 6.6 g/dL / ALK PHOS: 186 U/L / ALT: 21 U/L / AST: 22 U/L / GGT: x           PT/INR - ( 07 May 2020 05:07 )   PT: 12.2 sec;   INR: 1.09 ratio         PTT - ( 07 May 2020 05:07 )  PTT:30.3 sec  CAPILLARY BLOOD GLUCOSE      POCT Blood Glucose.: 77 mg/dL (07 May 2020 07:59)  POCT Blood Glucose.: 94 mg/dL (07 May 2020 05:42)  POCT Blood Glucose.: 100 mg/dL (06 May 2020 23:36)  POCT Blood Glucose.: 84 mg/dL (06 May 2020 15:58)  POCT Blood Glucose.: 92 mg/dL (06 May 2020 11:41)                        ----  Personally reviewed:  Vital sign trends: [  ] yes    [  ] no     [  ] n/a  Laboratory results: [  ] yes    [  ] no     [  ] n/a  Radiology results: [  ] yes    [  ] no     [  ] n/a  Culture results: [  ] yes    [  ] no     [  ] n/a  Consultant recommendations: [  ] yes    [  ] no     [  ] n/a

## 2020-05-07 NOTE — PROGRESS NOTE ADULT - PROBLEM SELECTOR PLAN 10
Pt on home hospice  - discussion with daughter, Pati Cuba [phone] who is HCP  - Pt is DNR/DNI. Goal is for patient to be comfortable. MOLST completed and placed in chart.

## 2020-05-07 NOTE — PROGRESS NOTE ADULT - PROBLEM SELECTOR PLAN 4
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 Mauri White at [facility]  - Pt on home hospice   - oxygen supplementation prn

## 2020-05-07 NOTE — PROGRESS NOTE ADULT - ASSESSMENT
Pt is a 85y Male POD 0 from ORIF L Distal Femur PPx Pathologic Fracture.  -Stable  -Tolerated procedure well  -Pain Control  -DVT PPx Starting POD 1  -Continue Post-Op Abx   -Encourage Incentive Spirometry  -NWB LLE in knee immobilizer with assistive devices  -PT/OT  -Med Management  -Dispo Planning  -Will discuss with attending and advise if plan changes.    Nicki Batres M.D.  PGY-2 Orthopaedic Surgery

## 2020-05-07 NOTE — PROGRESS NOTE ADULT - ASSESSMENT
A/P: 85M with left pathologic periprosthetic distal femur fracture, among multiple skeletal metastases  Plan for OR today for palliative fixation of distal femur fracture  NPO except meds  IVFs while NPO  Hold chemical dvt ppx  SCDs  Medical optimization for OR  FU labs  will discuss with attending and advise if plan changes

## 2020-05-07 NOTE — PROGRESS NOTE ADULT - PROBLEM SELECTOR PLAN 1
- s/p fall causing pathologic fracture   - XR showed Destructive lytic lesion involving the left superior pubic ramus. Sclerosis involving the left inferior pubic ramus.   - Ortho following (Dr. Silverman)- recs appreciated- for palliative surgery today  - pulm consult (Dr Davila) appreciated, cleared for procedure  - Pain management with morphine and percocet  - fall risk  - miralax and senna while on opiates  - IVF  - Pt is intermediate risk for this moderate risk procedure. Considering palliative surgery, patient is medically optimized for surgery. RCRI score 1  - hold DVT ppx pre op, per ortho

## 2020-05-07 NOTE — CONSULT NOTE ADULT - SUBJECTIVE AND OBJECTIVE BOX
Date/Time Patient Seen:  		  Referring MD:   Data Reviewed	       Patient is a 85y old  Male who presents with a chief complaint of Destructive lytic lesion of left superior pubic ramus (07 May 2020 07:19)      Subjective/HPI  in bed  seen and examined  vs and HD and Sat reviewed  labs reviewed  spoke with DTR  pt sees Dr Borrego for Pulm in [place]       History and Physical:   Source of Information	Patient  Outpatient Providers	PMD- Dr. Virgil Sampson  Oncologist- Dr. Mauri White     Patient Identity:  · Birth Sex	Male       History of Present Illness:  Reason for Admission: Destructive lytic lesion of left superior pubic ramus  History of Present Illness:   86 y/o male with PMHx Lung cancer with mets to bones (dx 8/2019) on home hospice, BPH, Dementia, Non-insulin dependent T2DM, hx TIA BIBA from home due to fall out of bed. Patient unable to give history due to dementia. History obtained from Pati Cuba ([phone]). Pt fell on 5/1/20 out of bed and fell again on 5/4. States that home hospice was unable to have patient receive outpatient XR and was advised to go to hospital. Patient occasionally ambulates and has been bedridden for the last week. Patient has been taking Percocet and Ativan ATC for pain but due to dementia, daughter unable to gauge his pain level. States that patient had a dry cough about a month ago and occasionally takes albuterol inhaler when he appears sob. Denies fevers/chills, n/v, diarrhea, abdominal pain, recent travel or COVID exposure. Of note, patient has not had any treatment for metastatic lung cancer due to rapid progression and onset of dementia.    In the ED, patient's vitals were: /71, HR 77, RR 16 and SpO2 95% on room air. Significant labs include: wbc 10.51, H/H 10.3/33.1, MCV 76.4, Na 133.   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 No midline shift or hydrocephalus. Mildly motion limited study.  L XR hip/pelvis: Destructive lytic lesion involving the left superior pubic ramus. Sclerosis involving the left inferior pubic ramus. The findings are likely metastatic. Cannot rule out underlying pathologic fracture. Consider CT scan for additional diagnostic benefit.  Comminuted displaced angulated distal left femoral shaft fracture just above the femoral component of a bipolar knee prosthesis. The fracture may be pathologic.   CXR: Bilateral interstitial and airspace infiltrates suggestive of atypical pneumonia/viral pneumonia including COVID 19.   CT LLE and pelvis pending results  EKG: SR with APCs, HR 85      Past Medical, Past Surgical, and Family History:  PAST MEDICAL HISTORY:  Bone cancer due to spread from other site     BPH (benign prostatic hyperplasia)     Dementia     Diabetes not on insulin    Lung cancer     TIA (transient ischemic attack).     PAST SURGICAL HISTORY:  H/O total knee replacement     History of cholecystectomy.     No pertinent family history in first degree relatives.     No Pertinent Family History in first degree relatives of: Covid 19.     Social History:  Social History (marital status, living situation, occupation, tobacco use, alcohol and drug use, and sexual history): Lives with daughter. On home hospice  	Ambulated occasionally with assistance. Bedridden for the last week.   	Tobacco: heavy smoker, 4ppd x50 years, quit 15 years ago  	Etoh: hx of heavy drinking, quit years ago  Recreational drugs: denies     Tobacco Screening:  · Core Measure Site	Yes  · Has the patient used tobacco in the past 30 days?	No    Risk Assessment:    Present on Admission:  Deep Venous Thrombosis	no  Pulmonary Embolus	no     Heart Failure:  Does this patient have a history of or has been diagnosed with heart failure? unknown.    PAST MEDICAL & SURGICAL HISTORY:  BPH (benign prostatic hyperplasia)  TIA (transient ischemic attack)  Bone cancer due to spread from other site  Lung cancer  Dementia  Diabetes: not on insulin  H/O total knee replacement  History of cholecystectomy        Medication list         MEDICATIONS  (STANDING):  dextrose 5%. 1000 milliLiter(s) (50 mL/Hr) IV Continuous <Continuous>  dextrose 50% Injectable 12.5 Gram(s) IV Push once  dextrose 50% Injectable 25 Gram(s) IV Push once  dextrose 50% Injectable 25 Gram(s) IV Push once  finasteride 5 milliGRAM(s) Oral daily  insulin lispro (HumaLOG) corrective regimen sliding scale   SubCutaneous every 6 hours  lactated ringers. 1000 milliLiter(s) (75 mL/Hr) IV Continuous <Continuous>  lactated ringers. 1000 milliLiter(s) (75 mL/Hr) IV Continuous <Continuous>  polyethylene glycol 3350 17 Gram(s) Oral daily  senna 2 Tablet(s) Oral at bedtime  tamsulosin 0.4 milliGRAM(s) Oral at bedtime    MEDICATIONS  (PRN):  acetaminophen   Tablet .. 650 milliGRAM(s) Oral every 6 hours PRN Temp greater or equal to 38.5C (101.3F)  ALBUTerol    90 MICROgram(s) HFA Inhaler 2 Puff(s) Inhalation every 6 hours PRN Shortness of Breath and/or Wheezing  dextrose 40% Gel 15 Gram(s) Oral once PRN Blood Glucose LESS THAN 70 milliGRAM(s)/deciliter  glucagon  Injectable 1 milliGRAM(s) IntraMuscular once PRN Glucose LESS THAN 70 milligrams/deciliter  LORazepam     Tablet 0.25 milliGRAM(s) Oral every 6 hours PRN Anxiety  morphine  - Injectable 2 milliGRAM(s) IV Push every 6 hours PRN Severe Pain (7 - 10)  oxycodone    5 mG/acetaminophen 325 mG 1 Tablet(s) Oral every 6 hours PRN Mild Pain (1 - 3)  oxycodone    5 mG/acetaminophen 325 mG 2 Tablet(s) Oral every 6 hours PRN Moderate Pain (4 - 6)         Vitals log        ICU Vital Signs Last 24 Hrs  T(C): 36.9 (07 May 2020 04:11), Max: 36.9 (06 May 2020 22:11)  T(F): 98.4 (07 May 2020 04:11), Max: 98.5 (06 May 2020 22:11)  HR: 81 (07 May 2020 09:21) (80 - 90)  BP: 141/- (07 May 2020 09:21) (112/67 - 171/62)  BP(mean): --  ABP: --  ABP(mean): --  RR: 18 (07 May 2020 04:11) (18 - 22)  SpO2: 93% (07 May 2020 09:21) (89% - 95%)           Input and Output:  I&O's Detail      Lab Data                        9.9    8.89  )-----------( 346      ( 07 May 2020 08:03 )             32.0     05-07    137  |  101  |  12  ----------------------------<  80  4.0   |  27  |  0.47<L>    Ca    9.6      07 May 2020 08:03    TPro  6.6  /  Alb  2.6<L>  /  TBili  0.6  /  DBili  x   /  AST  22  /  ALT  21  /  AlkPhos  186<H>  05-07            Review of Systems	  frail  weak      Objective     Physical Examination    heart s1s2  lung dec BS  abd soft  head nc  head at      Pertinent Lab findings & Imaging      Almendarez:  NO   Adequate UO     I&O's Detail           Discussed with:     Cultures:	        Radiology        EXAM:  XR CHEST AP OR PA 1V                            PROCEDURE DATE:  05/05/2020          INTERPRETATION:  CLINICAL INDICATION: 85 years  Male with admission.    COMPARISON: None    AP view of the chest demonstrates bilateral interstitial and airspace infiltrates. There is no pleural effusion. There is no pneumothorax.    The heart is normal in size. The mediastinum and farida cannot be assessed due to projection.     The pulmonary vasculature is normal.     Mild thoracic degenerative changes are present.    Right upper quadrant cholecystectomy clips are present.    IMPRESSION:    Bilateral interstitial and airspace infiltrates suggestive of atypical pneumonia/viral pneumonia including COVID 19.         DISCRETE X-RAY DATA:  Percent of LEFT lung opacification: 34-66%  Percent of RIGHT lung opacification: 34-66%  Change in lung opacification from most recent x-ray (<=3 days): No Prior  Change from prior dated 3 or more days (same admission): No Prior                SIMÓN MOORE M.D., ATTENDING RADIOLOGIST  This document has been electronically signed. May  5 2020  8:37PM

## 2020-05-07 NOTE — PROGRESS NOTE ADULT - PROBLEM SELECTOR PLAN 7
Chronic, stable  - reorient as needed  - Caution with BEERs medications  - poor PO intake, d/w daughter takes regular diabetic diet at home with kasandra bailey- d/w speech/swallow not candidate for eval as he is home hospice patient

## 2020-05-07 NOTE — DISCHARGE NOTE PROVIDER - NSDCCPCAREPLAN_GEN_ALL_CORE_FT
PRINCIPAL DISCHARGE DIAGNOSIS  Diagnosis: Femur fracture, left  Assessment and Plan of Treatment: 1.	Pain Control  2.	Non-weight bearing  left lower extremity in knee immobilizer, with assistive devices as needed.  3.	DVT Prophylaxis  4.	PT as needed  5.	Follow up with Dr. Silverman as outpatient in 10-14 days after discharge from the hospital or rehab. Call office for appointment.   6.	Staple/Suture removal and repeat x-rays on Post-Op Day 14.  7.	Ice/Elevate affected area as needed  8.	Keep Dressing/Immobilizer Clean and dry.      SECONDARY DISCHARGE DIAGNOSES  Diagnosis: Subdural hematoma  Assessment and Plan of Treatment:     Diagnosis: Viral pneumonia  Assessment and Plan of Treatment: PRINCIPAL DISCHARGE DIAGNOSIS  Diagnosis: Femur fracture, left  Assessment and Plan of Treatment: 1.	Pain Control  2.	Non-weight bearing  left lower extremity in knee immobilizer, with assistive devices as needed.  3.	DVT Prophylaxis  4.	PT as needed  5.	Follow up with Dr. Silverman as outpatient in 10-14 days after discharge from the hospital or rehab. Call office for appointment.   6.	Staple/Suture removal and repeat x-rays on Post-Op Day 14.  7.	Ice/Elevate affected area as needed  8.	Keep Dressing/Immobilizer Clean and dry.      SECONDARY DISCHARGE DIAGNOSES  Diagnosis: Lung cancer  Assessment and Plan of Treatment: Lung cancer with metastases  Home hospice   Follow up with oncologist and primary care physician    Diagnosis: Viral pneumonia  Assessment and Plan of Treatment: COVID test negative but cannot rule out completely given xray and lab findings  Upon discharge, you must self-quarantine for 14 days, or until the Department of Health contacts you. Please wear a face mask if you are around other individuals. Try to avoid contact with house members, family, and friends for the duration of this quarantine. Please follow up with your primary care physician within 2-3 weeks of your discharge from the hospital. Please take all medications as prescribed. If you experience any worsening or recurrence of your symptoms, particularly worsening or high fever, shortness of breathe, extreme fatigue, or bloody cough please call 9-1-1 immediately or report to the nearest Emergency Department. PRINCIPAL DISCHARGE DIAGNOSIS  Diagnosis: Femur fracture, left  Assessment and Plan of Treatment: You fractured your left femur. (bone in your thigh)   -take sctguoni79/325 oral tablet every four hours as needed for pain  -do not bear weight on your left leg wear a knee immobilizer, bear with assistive devices as needed  -take lovenox 40 daily for 1 month to prevent a clot from forming   4.	Physicl therapy as needed  5.	Follow up with Dr. Silverman as outpatient in 10-14 days after discharge from the hospital or rehab. Call office for appointment.   6.	Staple/Suture removal and repeat x-rays on Post-Op Day 14.  7.	Ice/Elevate affected area as needed  8.	Keep Dressing/Immobilizer Clean and dry.      SECONDARY DISCHARGE DIAGNOSES  Diagnosis: Lung cancer  Assessment and Plan of Treatment: Lung cancer with metastases  Home hospice   Follow up with oncologist and primary care physician    Diagnosis: Viral pneumonia  Assessment and Plan of Treatment: COVID test negative but cannot rule out completely given xray and lab findings  Upon discharge, you must self-quarantine for 14 days, or until the Department of Health contacts you. Please wear a face mask if you are around other individuals. Try to avoid contact with house members, family, and friends for the duration of this quarantine. Please follow up with your primary care physician within 2-3 weeks of your discharge from the hospital. Please take all medications as prescribed. If you experience any worsening or recurrence of your symptoms, particularly worsening or high fever, shortness of breathe, extreme fatigue, or bloody cough please call 9-1-1 immediately or report to the nearest Emergency Department. PRINCIPAL DISCHARGE DIAGNOSIS  Diagnosis: Femur fracture, left  Assessment and Plan of Treatment: You fractured your left femur. (bone in your thigh)   -take svmfzxhe51/325 oral tablet every four hours as needed for pain  -do not bear weight on your left leg wear a knee immobilizer, bear with assistive devices as needed  -take lovenox subq 40 daily for 1 month to prevent a clot from forming   -Physical therapy as needed   -Follow up with Dr. Silverman as outpatient in 10-14 days after discharge from the hospital or rehab. Call office for appointment.   -You will need to have your staples/sutures removed and repeat x-rays 14 days after your surgery day.   -Ice/Elevate affected area as needed  -Keep Dressing/Immobilizer Clean and dry.      SECONDARY DISCHARGE DIAGNOSES  Diagnosis: Constipation  Assessment and Plan of Treatment: take 2 tablets at bedtime as needed for constipation    Diagnosis: Lung cancer  Assessment and Plan of Treatment: You have solange cancer with metastases.  Conitnue your home hospice.   Follow up with oncologist Dr. Mauri White and primary care physician Dr. Andra Nino within 10-14 days after discharge.    Diagnosis: Dementia  Assessment and Plan of Treatment: continue taking your home ativan dose as prescribed    Diagnosis: BPH (benign prostatic hyperplasia)  Assessment and Plan of Treatment: continue your finasteride and flomax as prescribed    Diagnosis: Viral pneumonia  Assessment and Plan of Treatment: COVID test negative but cannot rule out completely given xray and lab findings. Upon discharge, you must self-quarantine for 14 days, or until the Department of Health contacts you. Please wear a face mask if you are around other individuals. Try to avoid contact with house members, family, and friends for the duration of this quarantine. Please follow up with your primary care physician within 2-3 weeks of your discharge from the hospital. Please take all medications as prescribed. If you experience any worsening or recurrence of your symptoms, particularly worsening or high fever, shortness of breathe, extreme fatigue, or bloody cough please call 9-1-1 immediately or report to the nearest Emergency Department. PRINCIPAL DISCHARGE DIAGNOSIS  Diagnosis: Femur fracture, left  Assessment and Plan of Treatment: You fractured your left femur. (bone in your thigh)   -take bfckxukm56/325 oral tablet every four hours as needed for pain  -do not bear weight on your left leg wear a knee immobilizer, bear with assistive devices as needed  -take lovenox subq 40 daily for 1 month to prevent a clot from forming   -Physical therapy as needed   -Follow up with Dr. Silverman as outpatient in 10-14 days after discharge from the hospital or rehab. Call office for appointment.   -You will need to have your staples/sutures removed and repeat x-rays 14 days after your surgery day.   -Ice/Elevate affected area as needed  -Keep Dressing/Immobilizer Clean and dry.      SECONDARY DISCHARGE DIAGNOSES  Diagnosis: Constipation  Assessment and Plan of Treatment: take 2 tablets at bedtime as needed for constipation    Diagnosis: Lung cancer  Assessment and Plan of Treatment: You have solange cancer with metastases.  Conitnue your home hospice.   Follow up with oncologist Dr. Mauri White and primary care physician Dr. Andra Nino within 10-14 days after discharge.    Diagnosis: Viral pneumonia  Assessment and Plan of Treatment: COVID test negative but cannot rule out completely given xray and lab findings. Upon discharge, you must self-quarantine for 14 days, or until the Department of Health contacts you. Please wear a face mask if you are around other individuals. Try to avoid contact with house members, family, and friends for the duration of this quarantine. Please follow up with your primary care physician within 2-3 weeks of your discharge from the hospital. Please take all medications as prescribed. If you experience any worsening or recurrence of your symptoms, particularly worsening or high fever, shortness of breathe, extreme fatigue, or bloody cough please call 9-1-1 immediately or report to the nearest Emergency Department.       Diagnosis: Dementia  Assessment and Plan of Treatment: continue taking your home ativan dose as prescribed    Diagnosis: BPH (benign prostatic hyperplasia)  Assessment and Plan of Treatment: continue your finasteride and flomax as prescribed PRINCIPAL DISCHARGE DIAGNOSIS  Diagnosis: Femur fracture, left  Assessment and Plan of Treatment: You fractured your left femur. (bone in your thigh)   -take yoejcucn30/325 oral tablet every four hours as needed for pain  -do not bear weight on your left leg wear a knee immobilizer, bear with assistive devices as needed  -take lovenox subq 40 daily for 1 month to prevent a clot from forming   -Physical therapy as needed   -Follow up with Dr. Silverman as outpatient in 10-14 days after discharge from the hospital or rehab. Call office for appointment.   -You will need to have your staples/sutures removed and repeat x-rays 14 days after your surgery day.   -Ice/Elevate affected area as needed  -Keep Dressing/Immobilizer Clean and dry.      SECONDARY DISCHARGE DIAGNOSES  Diagnosis: Constipation  Assessment and Plan of Treatment: take 2 tablets at bedtime as needed for constipation    Diagnosis: Lung cancer  Assessment and Plan of Treatment: You have solange cancer with metastases.  Conitnue your home hospice.   Follow up with oncologist Dr. Mauri White and primary care physician Dr. Adrián Herman within 10-14 days after discharge.    Diagnosis: Viral pneumonia  Assessment and Plan of Treatment: COVID test negative but cannot rule out completely given xray and lab findings. Upon discharge, you must self-quarantine for 14 days, or until the Department of Health contacts you. Please wear a face mask if you are around other individuals. Try to avoid contact with house members, family, and friends for the duration of this quarantine. Please follow up with your primary care physician within 2-3 weeks of your discharge from the hospital. Please take all medications as prescribed. If you experience any worsening or recurrence of your symptoms, particularly worsening or high fever, shortness of breathe, extreme fatigue, or bloody cough please call 9-1-1 immediately or report to the nearest Emergency Department.       Diagnosis: Dementia  Assessment and Plan of Treatment: continue taking your home ativan dose as prescribed    Diagnosis: BPH (benign prostatic hyperplasia)  Assessment and Plan of Treatment: continue your finasteride and flomax as prescribed

## 2020-05-07 NOTE — PROGRESS NOTE ADULT - PROBLEM SELECTOR PLAN 2
COVID PCR negative  Maintaining O2 sat RA  H/o lung CA unsure of baseline lung findings, elevated inflammatory markers could be due to chronic disease vs covid

## 2020-05-07 NOTE — DISCHARGE NOTE PROVIDER - CARE PROVIDER_API CALL
Anthony Silverman)  Orthopaedic Surgery  [address] [place] [address]  Phone: [phone]  Fax: [phone]  Follow Up Time: Anthony Silverman)  Orthopaedic Surgery  [address] [place] [address]  Phone: [phone]  Fax: [phone]  Follow Up Time:     Virgil Sampson  PCP  Phone: (   )    -  Fax: (   )    -  Follow Up Time:     Mauri White)  Hematology; Internal Medicine; Medical Oncology  [address] [facility] [address] [place] [address]  Phone: [phone]  Fax: [phone]  Follow Up Time: Anthony Silverman)  Orthopaedic Surgery  [address] [place] [address]  Phone: [phone]  Fax: [phone]  Follow Up Time: 1 week    Mauri White)  Hematology; Internal Medicine; Medical Oncology  [address] [facility] [address]  [place] [address]  Phone: [phone]  Fax: [phone]  Follow Up Time: 1 week    Virgil Sampson  PCP  Phone: (   )    -  Fax: (   )    -  Established Patient  Follow Up Time: 1 week

## 2020-05-07 NOTE — PROGRESS NOTE ADULT - SUBJECTIVE AND OBJECTIVE BOX
Orthopaedic Surgery Post-Operative Progress Note    Pt reports pain is well controlled. Tolerated procedure well.    Labs:                        9.4    12.82 )-----------( 340      ( 07 May 2020 20:31 )             30.6     05-07    135  |  101  |  16  ----------------------------<  106<H>  4.5   |  26  |  0.68    Ca    9.0      07 May 2020 20:31    TPro  6.6  /  Alb  2.6<L>  /  TBili  0.6  /  DBili  x   /  AST  22  /  ALT  21  /  AlkPhos  186<H>  05-07    PT/INR - ( 07 May 2020 05:07 )   PT: 12.2 sec;   INR: 1.09 ratio         PTT - ( 07 May 2020 05:07 )  PTT:30.3 sec    Vitals:  T(C): 36.3 (05-07-20 @ 21:14), Max: 36.9 (05-07-20 @ 04:11)  HR: 72 (05-07-20 @ 21:14) (57 - 96)  BP: 132/68 (05-07-20 @ 21:14) (100/68 - 168/64)  RR: 18 (05-07-20 @ 21:14) (14 - 21)  SpO2: 99% (05-07-20 @ 21:14) (89% - 100%)    Physical Exam:  General: NAD, Resting Comfortably    LLE:  Dressing Clean, Dry, Intact  in Knee Immobilizer  SILT L2-S1  Gsc/TA/EHL/FHL Intact  DP/PT 2+  Compartments Soft and Compressible  No calf tenderness

## 2020-05-08 DIAGNOSIS — J12.81 PNEUMONIA DUE TO SARS-ASSOCIATED CORONAVIRUS: ICD-10-CM

## 2020-05-08 DIAGNOSIS — S72.92XA UNSPECIFIED FRACTURE OF LEFT FEMUR, INITIAL ENCOUNTER FOR CLOSED FRACTURE: ICD-10-CM

## 2020-05-08 LAB
ALBUMIN SERPL ELPH-MCNC: 2.4 G/DL — LOW (ref 3.3–5)
ALP SERPL-CCNC: 174 U/L — HIGH (ref 40–120)
ALT FLD-CCNC: 19 U/L — SIGNIFICANT CHANGE UP (ref 12–78)
ANION GAP SERPL CALC-SCNC: 11 MMOL/L — SIGNIFICANT CHANGE UP (ref 5–17)
AST SERPL-CCNC: 23 U/L — SIGNIFICANT CHANGE UP (ref 15–37)
BASOPHILS # BLD AUTO: 0.02 K/UL — SIGNIFICANT CHANGE UP (ref 0–0.2)
BASOPHILS NFR BLD AUTO: 0.2 % — SIGNIFICANT CHANGE UP (ref 0–2)
BILIRUB SERPL-MCNC: 0.4 MG/DL — SIGNIFICANT CHANGE UP (ref 0.2–1.2)
BUN SERPL-MCNC: 22 MG/DL — SIGNIFICANT CHANGE UP (ref 7–23)
CALCIUM SERPL-MCNC: 8.6 MG/DL — SIGNIFICANT CHANGE UP (ref 8.5–10.1)
CHLORIDE SERPL-SCNC: 100 MMOL/L — SIGNIFICANT CHANGE UP (ref 96–108)
CO2 SERPL-SCNC: 25 MMOL/L — SIGNIFICANT CHANGE UP (ref 22–31)
CREAT SERPL-MCNC: 0.66 MG/DL — SIGNIFICANT CHANGE UP (ref 0.5–1.3)
EOSINOPHIL # BLD AUTO: 0 K/UL — SIGNIFICANT CHANGE UP (ref 0–0.5)
EOSINOPHIL NFR BLD AUTO: 0 % — SIGNIFICANT CHANGE UP (ref 0–6)
GLUCOSE SERPL-MCNC: 172 MG/DL — HIGH (ref 70–99)
HCT VFR BLD CALC: 28.8 % — LOW (ref 39–50)
HGB BLD-MCNC: 8.8 G/DL — LOW (ref 13–17)
IMM GRANULOCYTES NFR BLD AUTO: 1.2 % — SIGNIFICANT CHANGE UP (ref 0–1.5)
LYMPHOCYTES # BLD AUTO: 0.89 K/UL — LOW (ref 1–3.3)
LYMPHOCYTES # BLD AUTO: 8.6 % — LOW (ref 13–44)
MCHC RBC-ENTMCNC: 23.7 PG — LOW (ref 27–34)
MCHC RBC-ENTMCNC: 30.6 GM/DL — LOW (ref 32–36)
MCV RBC AUTO: 77.4 FL — LOW (ref 80–100)
MONOCYTES # BLD AUTO: 0.71 K/UL — SIGNIFICANT CHANGE UP (ref 0–0.9)
MONOCYTES NFR BLD AUTO: 6.9 % — SIGNIFICANT CHANGE UP (ref 2–14)
NEUTROPHILS # BLD AUTO: 8.58 K/UL — HIGH (ref 1.8–7.4)
NEUTROPHILS NFR BLD AUTO: 83.1 % — HIGH (ref 43–77)
NRBC # BLD: 0 /100 WBCS — SIGNIFICANT CHANGE UP (ref 0–0)
PLATELET # BLD AUTO: 341 K/UL — SIGNIFICANT CHANGE UP (ref 150–400)
POTASSIUM SERPL-MCNC: 4.8 MMOL/L — SIGNIFICANT CHANGE UP (ref 3.5–5.3)
POTASSIUM SERPL-SCNC: 4.8 MMOL/L — SIGNIFICANT CHANGE UP (ref 3.5–5.3)
PROT SERPL-MCNC: 6.2 G/DL — SIGNIFICANT CHANGE UP (ref 6–8.3)
RBC # BLD: 3.72 M/UL — LOW (ref 4.2–5.8)
RBC # FLD: 17.3 % — HIGH (ref 10.3–14.5)
SODIUM SERPL-SCNC: 136 MMOL/L — SIGNIFICANT CHANGE UP (ref 135–145)
WBC # BLD: 10.32 K/UL — SIGNIFICANT CHANGE UP (ref 3.8–10.5)
WBC # FLD AUTO: 10.32 K/UL — SIGNIFICANT CHANGE UP (ref 3.8–10.5)

## 2020-05-08 PROCEDURE — 99232 SBSQ HOSP IP/OBS MODERATE 35: CPT | Mod: GV

## 2020-05-08 RX ADMIN — OXYCODONE HYDROCHLORIDE 5 MILLIGRAM(S): 5 TABLET ORAL at 21:05

## 2020-05-08 RX ADMIN — Medication 1 TABLET(S): at 12:33

## 2020-05-08 RX ADMIN — POLYETHYLENE GLYCOL 3350 17 GRAM(S): 17 POWDER, FOR SOLUTION ORAL at 12:33

## 2020-05-08 RX ADMIN — Medication 3: at 17:31

## 2020-05-08 RX ADMIN — SODIUM CHLORIDE 50 MILLILITER(S): 9 INJECTION, SOLUTION INTRAVENOUS at 08:12

## 2020-05-08 RX ADMIN — Medication 500 MILLIGRAM(S): at 17:31

## 2020-05-08 RX ADMIN — Medication 0.25 MILLIGRAM(S): at 14:54

## 2020-05-08 RX ADMIN — Medication 1 MILLIGRAM(S): at 12:33

## 2020-05-08 RX ADMIN — Medication 100 MILLIGRAM(S): at 06:16

## 2020-05-08 RX ADMIN — Medication 1: at 08:11

## 2020-05-08 RX ADMIN — ONDANSETRON 4 MILLIGRAM(S): 8 TABLET, FILM COATED ORAL at 22:25

## 2020-05-08 RX ADMIN — FINASTERIDE 5 MILLIGRAM(S): 5 TABLET, FILM COATED ORAL at 12:33

## 2020-05-08 RX ADMIN — TAMSULOSIN HYDROCHLORIDE 0.4 MILLIGRAM(S): 0.4 CAPSULE ORAL at 21:05

## 2020-05-08 RX ADMIN — Medication 1: at 12:32

## 2020-05-08 RX ADMIN — ENOXAPARIN SODIUM 40 MILLIGRAM(S): 100 INJECTION SUBCUTANEOUS at 06:06

## 2020-05-08 NOTE — CONSULT NOTE ADULT - SUBJECTIVE AND OBJECTIVE BOX
HPI:  84 y/o male with PMHx Lung cancer with mets to bones (dx 8/2019) on home hospice, BPH, Dementia, Non-insulin dependent T2DM, hx TIA BIBA from home due to fall out of bed. Pt fell on 5/1/20 out of bed and fell again on 5/4 and was advised to go to hospital. Patient occasionally ambulates and has been bedridden for the last week. Patient has been taking Percocet and Ativan ATC for pain but due to dementia, daughter unable to gauge his pain level. States that patient had a dry cough about a month ago and occasionally takes albuterol inhaler when he appears sob. Denies fevers/chills, n/v, diarrhea, abdominal pain, recent travel or COVID exposure. Of note, patient has not had any treatment for metastatic lung cancer due to rapid progression and onset of dementia.    In the ED, patient's vitals were: /71, HR 77, RR 16 and SpO2 95% on room air. Significant labs include: wbc 10.51, H/H 10.3/33.1, MCV 76.4, Na 133.   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 No midline shift or hydrocephalus. Mildly motion limited study.  L XR hip/pelvis: Destructive lytic lesion involving the left superior pubic ramus. Sclerosis involving the left inferior pubic ramus. The findings are likely metastatic. Cannot rule out underlying pathologic fracture. Consider CT scan for additional diagnostic benefit.  Comminuted displaced angulated distal left femoral shaft fracture just above the femoral component of a bipolar knee prosthesis. The fracture may be pathologic.   CXR: Bilateral interstitial and airspace infiltrates suggestive of atypical pneumonia/viral pneumonia including COVID 19.   CT LLE and pelvis pending results  EKG: SR with APCs, HR 85 (05 May 2020 21:27)      PAST MEDICAL & SURGICAL HISTORY:  BPH (benign prostatic hyperplasia)  TIA (transient ischemic attack)  Bone cancer due to spread from other site  Lung cancer  Dementia  Diabetes: not on insulin  H/O total knee replacement  History of cholecystectomy      Antimicrobials      Immunological      Other  acetaminophen   Tablet .. 650 milliGRAM(s) Oral every 6 hours PRN  aluminum hydroxide/magnesium hydroxide/simethicone Suspension 30 milliLiter(s) Oral four times a day PRN  ascorbic acid 500 milliGRAM(s) Oral two times a day  dextrose 40% Gel 15 Gram(s) Oral once PRN  dextrose 5%. 1000 milliLiter(s) IV Continuous <Continuous>  dextrose 50% Injectable 12.5 Gram(s) IV Push once  dextrose 50% Injectable 25 Gram(s) IV Push once  dextrose 50% Injectable 25 Gram(s) IV Push once  enoxaparin Injectable 40 milliGRAM(s) SubCutaneous every 24 hours  finasteride 5 milliGRAM(s) Oral daily  folic acid 1 milliGRAM(s) Oral daily  glucagon  Injectable 1 milliGRAM(s) IntraMuscular once PRN  insulin lispro (HumaLOG) corrective regimen sliding scale   SubCutaneous three times a day before meals  insulin lispro (HumaLOG) corrective regimen sliding scale   SubCutaneous at bedtime  lactated ringers. 1000 milliLiter(s) IV Continuous <Continuous>  LORazepam    Concentrate 0.25 milliGRAM(s) Oral every 6 hours PRN  multivitamin 1 Tablet(s) Oral daily  ondansetron Injectable 4 milliGRAM(s) IV Push every 6 hours PRN  oxyCODONE    IR 5 milliGRAM(s) Oral every 4 hours PRN  oxyCODONE    IR 10 milliGRAM(s) Oral every 4 hours PRN  pantoprazole    Tablet 40 milliGRAM(s) Oral before breakfast  polyethylene glycol 3350 17 Gram(s) Oral daily  senna 2 Tablet(s) Oral at bedtime PRN  tamsulosin 0.4 milliGRAM(s) Oral at bedtime      Allergies    No Known Allergies    Intolerances        SOCIAL HISTORY:  Social History:  Lives with daughter. On home hospice  Ambulated occasionally with assistance. Bedridden for the last week.   Tobacco: heavy smoker, 4ppd x50 years, quit 15 years ago  Etoh: hx of heavy drinking, quit years ago  Recreational drugs: denies (05 May 2020 21:27)      FAMILY HISTORY:  No pertinent family history in first degree relatives      ROS:    limited    Vital Signs Last 24 Hrs  T(C): 36.8 (08 May 2020 15:28), Max: 36.8 (07 May 2020 18:40)  T(F): 98.3 (08 May 2020 15:28), Max: 98.3 (08 May 2020 15:28)  HR: 77 (08 May 2020 15:28) (72 - 96)  BP: 109/48 (08 May 2020 15:28) (100/68 - 154/70)  BP(mean): --  RR: 19 (08 May 2020 15:28) (14 - 21)  SpO2: 93% (08 May 2020 15:28) (90% - 100%)    PE:    HEENT:  NC, atraumatic  Lungs:  No accessory muscle use, breathing comfortably  Cor:  distant  Abd:  Symmetric, appears normal,   Extrem:  No cyanosis      LABS:                        8.8    10.32 )-----------( 341      ( 08 May 2020 06:45 )             28.8       WBC Count: 10.32 K/uL (05-08-20 @ 06:45)  WBC Count: 12.82 K/uL (05-07-20 @ 20:31)  WBC Count: 8.89 K/uL (05-07-20 @ 08:03)  WBC Count: 8.61 K/uL (05-07-20 @ 05:07)  WBC Count: 11.23 K/uL (05-06-20 @ 05:06)  WBC Count: 10.51 K/uL (05-05-20 @ 20:29)      05-08    136  |  100  |  22  ----------------------------<  172<H>  4.8   |  25  |  0.66    Ca    8.6      08 May 2020 06:45    TPro  6.2  /  Alb  2.4<L>  /  TBili  0.4  /  DBili  x   /  AST  23  /  ALT  19  /  AlkPhos  174<H>  05-08      Creatinine, Serum: 0.66 mg/dL (05-08-20 @ 06:45)  Creatinine, Serum: 0.68 mg/dL (05-07-20 @ 20:31)  Creatinine, Serum: 0.47 mg/dL (05-07-20 @ 08:03)  Creatinine, Serum: 0.47 mg/dL (05-07-20 @ 05:07)  Creatinine, Serum: 0.60 mg/dL (05-06-20 @ 05:06)  Creatinine, Serum: 0.56 mg/dL (05-05-20 @ 20:29)              COVID RISK SCORE:  Auto Neutrophil #: 8.58 K/uL (05-08-20 @ 06:45)  Auto Lymphocyte #: 0.89 K/uL (05-08-20 @ 06:45)  Auto Neutrophil #: 6.16 K/uL (05-07-20 @ 08:03)  Auto Lymphocyte #: 1.33 K/uL (05-07-20 @ 08:03)  Auto Neutrophil #: 8.03 K/uL (05-06-20 @ 05:06)  Auto Lymphocyte #: 1.48 K/uL (05-06-20 @ 05:06)  Auto Neutrophil #: 6.82 K/uL (05-05-20 @ 20:29)  Auto Lymphocyte #: 1.71 K/uL (05-05-20 @ 20:29)      Auto Eosinophil #: 0.00 K/uL (05-08-20 @ 06:45)  Auto Eosinophil #: 0.11 K/uL (05-07-20 @ 08:03)  Auto Eosinophil #: 0.08 K/uL (05-06-20 @ 05:06)  Auto Eosinophil #: 0.13 K/uL (05-05-20 @ 20:29)                  Ferritin, Serum: 758 ng/mL (05-06-20 @ 08:27)        INR: 1.09 ratio (05-07-20 @ 05:07)  Activated Partial Thromboplastin Time: 30.3 sec (05-07-20 @ 05:07)  INR: 1.03 ratio (05-06-20 @ 05:06)  Activated Partial Thromboplastin Time: 24.4 sec (05-06-20 @ 05:06)  INR: 1.00 ratio (05-05-20 @ 20:29)  Activated Partial Thromboplastin Time: 29.5 sec (05-05-20 @ 20:29)    D-Dimer Assay, Quantitative: 976 ng/mL DDU (05-06-20 @ 05:06)        MICROBIOLOGY:    COVID-19 PCR: NotDetec (05.05.20 @ 21:48)        RADIOLOGY & ADDITIONAL STUDIES:    --< from: Xray Chest 1 View AP/PA (05.05.20 @ 19:46) >  EXAM:  XR CHEST AP OR PA 1V                            PROCEDURE DATE:  05/05/2020          INTERPRETATION:  CLINICAL INDICATION: 85 years  Male with admission.    COMPARISON: None    AP view of the chest demonstrates bilateral interstitial and airspace infiltrates. There is no pleural effusion. There is no pneumothorax.    The heart is normal in size. The mediastinum and farida cannot be assessed due to projection.     The pulmonary vasculature is normal.     Mild thoracic degenerative changes are present.    Right upper quadrant cholecystectomy clips are present.    IMPRESSION:    Bilateral interstitial and airspace infiltrates suggestive of atypical pneumonia/viral pneumonia including COVID 19.     < from: Xray Femur 2 Views, Right (05.05.20 @ 23:55) >  EXAM:  XR FEMUR 2 VIEWS RT                            PROCEDURE DATE:  05/05/2020          INTERPRETATION:  Right femur    HISTORY: Bony metastasis      Two views of the right femur show no evidence of acute fracture nor destructive change. The joint spaces show chronic arthritic changes of the knee joint.    IMPRESSION: No evidence of bony destruction.

## 2020-05-08 NOTE — OCCUPATIONAL THERAPY INITIAL EVALUATION ADULT - PERTINENT HX OF CURRENT PROBLEM, REHAB EVAL
86 y/o male with PMHx Lung cancer with mets to bones, BPH, Dementia, Non-insulin dependent T2DM, Lung cancer, TIA BIBA from home due to fall out of bed. Found to have destructive lytic lesion involving the left superior pubic ramus, likely metastatic pathologic fracture. Also cxr findings suggestive of atypical pneumonia, r/o COVID 19. S/p ORIF left hip 5/7/20.

## 2020-05-08 NOTE — PROGRESS NOTE ADULT - PROBLEM SELECTOR PLAN 2
Pt calling to check on status   COVID PCR negative  Maintaining O2 sat RA  H/o lung CA unsure of baseline lung findings, elevated inflammatory markers could be due to chronic disease vs covid  Pulm (Dr Davila) recs appreciated

## 2020-05-08 NOTE — CONSULT NOTE ADULT - PROBLEM SELECTOR RECOMMENDATION 9
planned for palliative hip surgery - imaging reviewed - Ortho eval noted - spoke with DTR  pt is DNR DNI - lung ca with copd and dementia - mets ca - Osseous mets - heavy ex smoker  oral and skin care - assist with ADL - VS and HD and Sat reviewed -   pain assessment and management  discussed care and goals with Dtr - pt was on Home Hospice   covid PCR neg here in the hospital -   ferritin and crp and imaging suggestive of possible covid - however - may also be abnormal because of known Cancer and Fall and COPD   it is impossible to rule out Covid in this patient with 100 percent certainty  no objection for palliative operative intervention in this frail and weak man  will follow post op and monitor course of illness and care
Thank you for consulting us and involving us in the management of this most interesting and challenging case. I certainly appreciate the challenges, stress and sacrifice during these difficult and challenging times.  Please call with any further questions or changes in clinical status for which ID input would be helpful

## 2020-05-08 NOTE — PROGRESS NOTE ADULT - PROBLEM SELECTOR PLAN 9
IMPROVE VTE Individual Risk Assessment          RISK                                                          Points  [  ] Previous VTE                                                3  [  ] Thrombophilia                                             2  [ x ] Lower limb paralysis                                   2        (unable to hold up >15 seconds)    [x  ] Current Cancer                                             2         (within 6 months)  [x  ] Immobilization > 24 hrs                              1  [  ] ICU/CCU stay > 24 hours                             1  [x  ] Age > 60                                                         1    IMPROVE VTE Score: 6  DVT ppx: Lovenox

## 2020-05-08 NOTE — CONSULT NOTE ADULT - REASON FOR ADMISSION
Destructive lytic lesion of left superior pubic ramus

## 2020-05-08 NOTE — PROGRESS NOTE ADULT - PROBLEM SELECTOR PLAN 1
- s/p ORIF L Distal Femur PPx Pathologic Fracture POD #1  - Ortho following (Dr. Silverman)  - Pain management per surgery  - PT  - fall risk  - miralax and senna while on opiates  - IVF  - has home hospice

## 2020-05-08 NOTE — PHYSICAL THERAPY INITIAL EVALUATION ADULT - ADDITIONAL COMMENTS
SW called pt daughter Pati; reported that pt lives in private home with her. Pati stated that pt does  occasionally ambulate with walker but has been mostly bedridden for  approximately one week. Pati reported that pt forgot to use walker when  ambulating and fell resulting in femur fracture. Pati stated that pt has been  on home hospice with [facility] for a few weeks.

## 2020-05-08 NOTE — OCCUPATIONAL THERAPY INITIAL EVALUATION ADULT - TRANSFER TRAINING, PT EVAL
pt will improve sit to/from stands to modAx1 in prep for safe bed to chair transfers to promote ADL participation OOB within 2-3 sessions

## 2020-05-08 NOTE — OCCUPATIONAL THERAPY INITIAL EVALUATION ADULT - ADDITIONAL COMMENTS
Unable to obtain from pt due to confusion (h/o dementia). Per chart, pt lives with family and has been on home hospice. Pt has been bedridden since fall approx 1 week ago but prior to that would ambulate with RW at times.

## 2020-05-08 NOTE — PROGRESS NOTE ADULT - ASSESSMENT
86 y/o male with PMHx Lung cancer with mets to bones, BPH, Dementia, Non-insulin dependent T2DM, Lung cancer, TIA BIBA from home due to fall out of bed. Found to have destructive lytic lesion involving the left superior pubic ramus, likely metastatic pathologic fracture; s/p ORIF L Distal Femur PPx Pathologic Fracture 5/7/20.

## 2020-05-08 NOTE — PROGRESS NOTE ADULT - SUBJECTIVE AND OBJECTIVE BOX
Patient is a 85y old  Male who presents with a chief complaint of Destructive lytic lesion of left superior pubic ramus (06 May 2020 08:05)      FROM ADMISSION H+P:   HPI:  86 y/o male with PMHx Lung cancer with mets to bones (dx 8/2019) on home hospice, BPH, Dementia, Non-insulin dependent T2DM, hx TIA BIBA from home due to fall out of bed. Patient unable to give history due to dementia. History obtained from Pati [place] ([phone]). Pt fell on 5/1/20 out of bed and fell again on 5/4. States that home hospice was unable to have patient receive outpatient XR and was advised to go to hospital. Patient occasionally ambulates and has been bedridden for the last week. Patient has been taking Percocet and Ativan ATC for pain but due to dementia, daughter unable to gauge his pain level. States that patient had a dry cough about a month ago and occasionally takes albuterol inhaler when he appears sob. Denies fevers/chills, n/v, diarrhea, abdominal pain, recent travel or COVID exposure. Of note, patient has not had any treatment for metastatic lung cancer due to rapid progression and onset of dementia.    In the ED, patient's vitals were: /71, HR 77, RR 16 and SpO2 95% on room air. Significant labs include: wbc 10.51, H/H 10.3/33.1, MCV 76.4, Na 133.   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 No midline shift or hydrocephalus. Mildly motion limited study.  L XR hip/pelvis: Destructive lytic lesion involving the left superior pubic ramus. Sclerosis involving the left inferior pubic ramus. The findings are likely metastatic. Cannot rule out underlying pathologic fracture. Consider CT scan for additional diagnostic benefit.  Comminuted displaced angulated distal left femoral shaft fracture just above the femoral component of a bipolar knee prosthesis. The fracture may be pathologic.   CXR: Bilateral interstitial and airspace infiltrates suggestive of atypical pneumonia/viral pneumonia including COVID 19.   CT LLE and pelvis pending results  EKG: SR with APCs, HR 85 (05 May 2020 21:27)      ----  INTERVAL HPI/OVERNIGHT EVENTS: Pt seen and evaluated at the bedside. Patient is poor historian at baseline secondary to dementia. Patient awake and alert, does not appear to be in any pain or acute distress.       ----  PAST MEDICAL & SURGICAL HISTORY:  BPH (benign prostatic hyperplasia)  TIA (transient ischemic attack)  Bone cancer due to spread from other site  Lung cancer  Dementia  Diabetes: not on insulin  H/O total knee replacement  History of cholecystectomy      FAMILY HISTORY:  No pertinent family history in first degree relatives      Allergies    No Known Allergies    Intolerances        ----  REVIEW OF SYSTEMS:  as per HPI    ----  PHYSICAL EXAM:  	General: elderly, frail male, lying in bed comfortably, NAD  	HEENT: NCAT, dry mucous membranes, Hard of hearing  	Neurology: A&Ox1 (person), no focal deficits  	Respiratory: CTA bl, decreased breath sounds, no rhonchi, no rales  	CV: RRR, +S1/S2, no murmurs, rubs or gallops  	Abdominal: Soft, NT, ND +BSx4  	Extremities: left knee immobilizer, no edema noted  	MSK: no joint swelling   Skin: large ecchymosis of left lateral hip/thigh extending down to above knee    Vital Signs Last 24 Hrs  T(C): 36.9 (07 May 2020 04:11), Max: 36.9 (06 May 2020 22:11)  T(F): 98.4 (07 May 2020 04:11), Max: 98.5 (06 May 2020 22:11)  HR: 81 (07 May 2020 09:21) (80 - 90)  BP: 141/- (07 May 2020 09:21) (112/67 - 171/62)  BP(mean): --  RR: 18 (07 May 2020 04:11) (18 - 22)  SpO2: 93% (07 May 2020 09:21) (89% - 95%)    ----  I&O's Summary      LABS:                                   9.9    8.89  )-----------( 346      ( 07 May 2020 08:03 )             32.0     07 May 2020 08:03    137    |  101    |  12     ----------------------------<  80     4.0     |  27     |  0.47     Ca    9.6        07 May 2020 08:03    TPro  6.6    /  Alb  2.6    /  TBili  0.6    /  DBili  x      /  AST  22     /  ALT  21     /  AlkPhos  186    07 May 2020 08:03    LIVER FUNCTIONS - ( 07 May 2020 08:03 )  Alb: 2.6 g/dL / Pro: 6.6 g/dL / ALK PHOS: 186 U/L / ALT: 21 U/L / AST: 22 U/L / GGT: x           PT/INR - ( 07 May 2020 05:07 )   PT: 12.2 sec;   INR: 1.09 ratio         PTT - ( 07 May 2020 05:07 )  PTT:30.3 sec  CAPILLARY BLOOD GLUCOSE      POCT Blood Glucose.: 77 mg/dL (07 May 2020 07:59)  POCT Blood Glucose.: 94 mg/dL (07 May 2020 05:42)  POCT Blood Glucose.: 100 mg/dL (06 May 2020 23:36)  POCT Blood Glucose.: 84 mg/dL (06 May 2020 15:58)  POCT Blood Glucose.: 92 mg/dL (06 May 2020 11:41)                        ----  Personally reviewed:  Vital sign trends: [  ] yes    [  ] no     [  ] n/a  Laboratory results: [  ] yes    [  ] no     [  ] n/a  Radiology results: [  ] yes    [  ] no     [  ] n/a  Culture results: [  ] yes    [  ] no     [  ] n/a  Consultant recommendations: [  ] yes    [  ] no     [  ] n/a

## 2020-05-08 NOTE — PHYSICAL THERAPY INITIAL EVALUATION ADULT - GENERAL OBSERVATIONS, REHAB EVAL
Patient received semifowler in bed, L knee immobilizer in place, in NAD. RN present outside of room.

## 2020-05-08 NOTE — CONSULT NOTE ADULT - ASSESSMENT
84 y/o male with PMHx Lung cancer with mets to bones (dx 8/2019) on home hospice, BPH, Dementia, Non-insulin dependent T2DM, hx TIA BIBA from home due to fall out of bed. presumed to be COVID+  despite neg PCR, metastatic lung cancer  COVID-19---high risk period for decompensation  (7-14 days post symptom onset), critical to determine date of symptom onset,  avoid aerosolizing procedures, avoid excessive blood draws so limit lab testing and draws - do recommend for hospitalized patients  -For all patients: daily D-dimer, BMP, CBC w diff, D-dimer, [CBC w diff to follow NLR (NLR <3 low vs >5 high)  D-dimer (<1,000 vs >1,000)]-other labs at admission to risk stratify and predict clinical course, (would repeat on day of decompensation)  Procalcitonin (>0.2 associated with more severe disease),  Ferritin (lower risk <450 vs >850),  CRP (low risk <2 and higher risk >6)  and if prognosis is unclear or if immunomodulators being considered: LDH, ESR, PT/PTT, CK, total, lactate, troponin, fibrinogen, -antibiotics only if there is concern for a bacterial process,  if immunosuppressant medications or medications that compromise protective acidity of GI tract are used recommend  consideration of impact on risk of infectious and other complications including thromboembolic (consider proning and tolerating lower oxygen saturation to avoid intubation).   --DVT prophylaxis broadly recommended and with very high incidence of PEs patients sick enough to require higher levels of oxygen may benefit from full dose anticoagulation with lovenox 1mg/kg subcut BID (renally adjusted as needed to QD) and would track daily D-dimers, PT/PTT, fibrinogen, CRP, procalcitonin and platelet counts from day of increased oxygen requirement and for high risk patients requiring steroids or D-dimer >3x ULN  (>700) consider discharge on oral anticoagulation with rivaroxaban (Xarelto)  10mg PO QD or Eliquis 2.5mg PO BID  x 6 weeks in high risk patients, unclear if ASA of benefit  5/7-Open reduction-femur fx 07-May-2020 20:04:02  Nicki Batres.  5/8 -on NC-no abx recommended, supportive care

## 2020-05-08 NOTE — PROGRESS NOTE ADULT - ASSESSMENT
A/P: Pt is a 85y Male POD 1 from [facility]-Prosthetic Pathologic Distal Femur Fracture.  -Pain Control  -DVT PPx  -NWB LLE in Knee Immobilizer  -PT/OT  -Encourage Incentive Spirometry  -Med Management  -Dispo Planning  -Will discuss with attending and advise if plan changes    Nicki Batres M.D.  PGY-2 Orthopaedic Surgery

## 2020-05-08 NOTE — CHART NOTE - NSCHARTNOTEFT_GEN_A_CORE
Nutrition Initial Assessment    Nutrition Consult Received: Yes [   ]  No [ x  ]    Reason for Initial Nutrition Assessment:pressure injury    Source of Information: Unable to conduct a fact to face interview due to limited contact restrictions related to pt's medical condition and isolation precautions. Information obtained from the EMR.    Admitting Diagnosis: 85y Male admitted for Femur fracture, left    PAST MEDICAL & SURGICAL HISTORY:  BPH (benign prostatic hyperplasia)  TIA (transient ischemic attack)  Bone cancer due to spread from other site  Lung cancer  Dementia  Diabetes: not on insulin  H/O total knee replacement  History of cholecystectomy      Subjective Information: Pt with dementia, info obtained from EMR. Pt s/p hip fx with OR yesterday. Pt on home hospice PTA, to return to hospice at d/c. Hx lung/bone ca, dementia, DM . Hba1c 7.2. PTA on regular diet with glucerna supplements. Per MD, no SLP eval due to hospice status. St 2 pressure injury buttocks, on mvi/vit c. Bowel regimen noted, will monitor for BM.      GI Issues:    Current Nutrition Order:  PO Intake:   Good (%) [   ]    Fair (50-75%) [   ]    Poor (<50%) [   ]    Skin Integrity:see above    Labs:   05-08 Na136 mmol/L Glu 172 mg/dL<H> K+ 4.8 mmol/L Cr  0.66 mg/dL BUN 22 mg/dL Phos n/a   Alb 2.4 g/dL<L> PAB n/a           POCT Blood Glucose.: 174 mg/dL (05-08-20 @ 07:20)  POCT Blood Glucose.: 129 mg/dL (05-07-20 @ 21:47)  POCT Blood Glucose.: 101 mg/dL (05-07-20 @ 20:06)  POCT Blood Glucose.: 90 mg/dL (05-07-20 @ 18:54)  POCT Blood Glucose.: 113 mg/dL (05-07-20 @ 11:43)    Medications:  MEDICATIONS  (STANDING):  ascorbic acid 500 milliGRAM(s) Oral two times a day  dextrose 5%. 1000 milliLiter(s) (50 mL/Hr) IV Continuous <Continuous>  dextrose 50% Injectable 12.5 Gram(s) IV Push once  dextrose 50% Injectable 25 Gram(s) IV Push once  dextrose 50% Injectable 25 Gram(s) IV Push once  enoxaparin Injectable 40 milliGRAM(s) SubCutaneous every 24 hours  finasteride 5 milliGRAM(s) Oral daily  folic acid 1 milliGRAM(s) Oral daily  insulin lispro (HumaLOG) corrective regimen sliding scale   SubCutaneous three times a day before meals  insulin lispro (HumaLOG) corrective regimen sliding scale   SubCutaneous at bedtime  lactated ringers. 1000 milliLiter(s) (50 mL/Hr) IV Continuous <Continuous>  multivitamin 1 Tablet(s) Oral daily  pantoprazole    Tablet 40 milliGRAM(s) Oral before breakfast  polyethylene glycol 3350 17 Gram(s) Oral daily  tamsulosin 0.4 milliGRAM(s) Oral at bedtime    MEDICATIONS  (PRN):  acetaminophen   Tablet .. 650 milliGRAM(s) Oral every 6 hours PRN Temp greater or equal to 38C (100.4F), Mild Pain (1 - 3)  aluminum hydroxide/magnesium hydroxide/simethicone Suspension 30 milliLiter(s) Oral four times a day PRN Indigestion  dextrose 40% Gel 15 Gram(s) Oral once PRN Blood Glucose LESS THAN 70 milliGRAM(s)/deciliter  glucagon  Injectable 1 milliGRAM(s) IntraMuscular once PRN Glucose LESS THAN 70 milligrams/deciliter  LORazepam    Concentrate 0.25 milliGRAM(s) Oral every 6 hours PRN Agitation  ondansetron Injectable 4 milliGRAM(s) IV Push every 6 hours PRN Nausea and/or Vomiting  oxyCODONE    IR 5 milliGRAM(s) Oral every 4 hours PRN Moderate Pain (4 - 6)  oxyCODONE    IR 10 milliGRAM(s) Oral every 4 hours PRN Severe Pain (7 - 10)  senna 2 Tablet(s) Oral at bedtime PRN Constipation    Admitted Anthropometrics:    Height (cm): 179.8 (05-07-20 @ 01:40)  Weight (kg): 68 (05-07-20 @ 01:40)  BMI (kg/m2): 21 (05-07-20 @ 01:40)    Nutrition Focused Physical Exam: Unable to complete due to limited isolation contact precautions at this time.     Estimated Energy Needs (_30_ kcal/kg- __35_ kcal/kg): 2000-2300cals  Estimated Protein Needs (__1.2 g/kg- _2__ g/kg): 80-135g pro  Based on weight of:149#    [  ] Nutrition Diagnosis:  [ x ] No active nutrition diagnosis at this time(hospice)  [  ] Current medical condition precludes nutrition intervention    Goal:    Nutrition Interventions:     Recommendations:recommend advance to consistent CHO/glucerna TID as feasible      RD to follow-up per protocol.

## 2020-05-08 NOTE — PROGRESS NOTE ADULT - SUBJECTIVE AND OBJECTIVE BOX
Orthopaedic Surgery Progress Note    Pt seen and examined at bedside. Pt reports pain is well controlled. No acute overnight events.     Labs:                        8.8    10.32 )-----------( 341      ( 08 May 2020 06:45 )             28.8     05-08    136  |  100  |  22  ----------------------------<  172<H>  4.8   |  25  |  0.66    Ca    8.6      08 May 2020 06:45    TPro  6.2  /  Alb  2.4<L>  /  TBili  0.4  /  DBili  x   /  AST  23  /  ALT  19  /  AlkPhos  174<H>  05-08    PT/INR - ( 07 May 2020 05:07 )   PT: 12.2 sec;   INR: 1.09 ratio         PTT - ( 07 May 2020 05:07 )  PTT:30.3 sec    Vitals:  T(C): 36.3 (05-08-20 @ 04:34), Max: 36.9 (05-07-20 @ 15:45)  HR: 83 (05-08-20 @ 04:34) (72 - 96)  BP: 125/60 (05-08-20 @ 04:34) (100/68 - 154/70)  RR: 18 (05-08-20 @ 04:34) (14 - 21)  SpO2: 91% (05-08-20 @ 04:34) (91% - 100%)    Physical Exam:  Gen: NAD, resting comfortably    LLE:  Dressing clean, dry, intact  in knee immobilizer  +EHL/FHL/TA/GS  SILT L2-S1  +DP/PT Pulses  Compartments soft and compressible  No calf TTP B/L

## 2020-05-08 NOTE — OCCUPATIONAL THERAPY INITIAL EVALUATION ADULT - ORIENTATION, REHAB EVAL
unable to assess/pt confusion and language barrier; pt switches between full sentences in English and ? Frisian, easily distractible

## 2020-05-08 NOTE — PHYSICAL THERAPY INITIAL EVALUATION ADULT - PERTINENT HX OF CURRENT PROBLEM, REHAB EVAL
86 y/o male with PMHx Lung cancer with mets to bones, BPH, Dementia, Non-insulin dependent T2DM, Lung cancer, TIA BIBA from home due to fall out of bed. Found to have destructive lytic lesion involving the left superior pubic ramus, likely metastatic pathologic fracture. Also cxr findings suggestive of atypical pneumonia, r/o COVID 19.

## 2020-05-08 NOTE — OCCUPATIONAL THERAPY INITIAL EVALUATION ADULT - IADL RETRAINING, OT EVAL
Patient will increase standing tolerance to 1-2 minutes with RW and NWB LLE to promote increased safety with bed to chair transfers within 2-3 sessions

## 2020-05-08 NOTE — PROGRESS NOTE ADULT - PROBLEM SELECTOR PLAN 4
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 Mauri White at [facility]  - Pt on home hospice   - oxygen supplementation prn

## 2020-05-08 NOTE — PROGRESS NOTE ADULT - SUBJECTIVE AND OBJECTIVE BOX
Date/Time Patient Seen:  		  Referring MD:   Data Reviewed	       Patient is a 85y old  Male who presents with a chief complaint of Destructive lytic lesion of left superior pubic ramus (08 May 2020 07:19)      Subjective/HPI     PAST MEDICAL & SURGICAL HISTORY:  BPH (benign prostatic hyperplasia)  TIA (transient ischemic attack)  Bone cancer due to spread from other site  Lung cancer  Dementia  Diabetes: not on insulin  H/O total knee replacement  History of cholecystectomy        Medication list         MEDICATIONS  (STANDING):  ascorbic acid 500 milliGRAM(s) Oral two times a day  dextrose 5%. 1000 milliLiter(s) (50 mL/Hr) IV Continuous <Continuous>  dextrose 50% Injectable 12.5 Gram(s) IV Push once  dextrose 50% Injectable 25 Gram(s) IV Push once  dextrose 50% Injectable 25 Gram(s) IV Push once  enoxaparin Injectable 40 milliGRAM(s) SubCutaneous every 24 hours  finasteride 5 milliGRAM(s) Oral daily  folic acid 1 milliGRAM(s) Oral daily  insulin lispro (HumaLOG) corrective regimen sliding scale   SubCutaneous three times a day before meals  insulin lispro (HumaLOG) corrective regimen sliding scale   SubCutaneous at bedtime  lactated ringers. 1000 milliLiter(s) (50 mL/Hr) IV Continuous <Continuous>  multivitamin 1 Tablet(s) Oral daily  pantoprazole    Tablet 40 milliGRAM(s) Oral before breakfast  polyethylene glycol 3350 17 Gram(s) Oral daily  tamsulosin 0.4 milliGRAM(s) Oral at bedtime    MEDICATIONS  (PRN):  acetaminophen   Tablet .. 650 milliGRAM(s) Oral every 6 hours PRN Temp greater or equal to 38C (100.4F), Mild Pain (1 - 3)  aluminum hydroxide/magnesium hydroxide/simethicone Suspension 30 milliLiter(s) Oral four times a day PRN Indigestion  dextrose 40% Gel 15 Gram(s) Oral once PRN Blood Glucose LESS THAN 70 milliGRAM(s)/deciliter  glucagon  Injectable 1 milliGRAM(s) IntraMuscular once PRN Glucose LESS THAN 70 milligrams/deciliter  LORazepam    Concentrate 0.25 milliGRAM(s) Oral every 6 hours PRN Agitation  ondansetron Injectable 4 milliGRAM(s) IV Push every 6 hours PRN Nausea and/or Vomiting  oxyCODONE    IR 5 milliGRAM(s) Oral every 4 hours PRN Moderate Pain (4 - 6)  oxyCODONE    IR 10 milliGRAM(s) Oral every 4 hours PRN Severe Pain (7 - 10)  senna 2 Tablet(s) Oral at bedtime PRN Constipation         Vitals log        ICU Vital Signs Last 24 Hrs  T(C): 36.4 (08 May 2020 07:19), Max: 36.9 (07 May 2020 15:45)  T(F): 97.6 (08 May 2020 07:19), Max: 98.4 (07 May 2020 15:45)  HR: 79 (08 May 2020 07:19) (72 - 96)  BP: 101/55 (08 May 2020 07:19) (100/68 - 154/70)  BP(mean): --  ABP: --  ABP(mean): --  RR: 19 (08 May 2020 07:19) (14 - 21)  SpO2: 90% (08 May 2020 07:19) (90% - 100%)           Input and Output:  I&O's Detail    07 May 2020 07:01  -  08 May 2020 07:00  --------------------------------------------------------  IN:    lactated ringers.: 550 mL  Total IN: 550 mL    OUT:  Total OUT: 0 mL    Total NET: 550 mL          Lab Data                        8.8    10.32 )-----------( 341      ( 08 May 2020 06:45 )             28.8     05-08    136  |  100  |  22  ----------------------------<  172<H>  4.8   |  25  |  0.66    Ca    8.6      08 May 2020 06:45    TPro  6.2  /  Alb  2.4<L>  /  TBili  0.4  /  DBili  x   /  AST  23  /  ALT  19  /  AlkPhos  174<H>  05-08            Review of Systems	      Objective     Physical Examination    heart s1s2  lung dec BS  abd soft  head nc  head at  on o2 support      Pertinent Lab findings & Imaging      Erickson:  NO   Adequate UO     I&O's Detail    07 May 2020 07:01  -  08 May 2020 07:00  --------------------------------------------------------  IN:    lactated ringers.: 550 mL  Total IN: 550 mL    OUT:  Total OUT: 0 mL    Total NET: 550 mL               Discussed with:     Cultures:	        Radiology

## 2020-05-08 NOTE — PROGRESS NOTE ADULT - PROBLEM SELECTOR PLAN 1
85y Male POD 1 from ORIF L Distal Femur PPx Pathologic Fracture  DM care  mets ca - Lung Ca - neg covid on PCR  on o2 support - keep sat > 88 pct - use I yaima if able to cooperate  VS and HD and Sat -   pain rx regimen - Opioids on order - Bowel regimen on order -   supportive medical regimen  pt is DNR DNI  prognosis poor - pt was on Home Hospice -   dc plan will need to include consideration for PT and Hospice and Mets Lung Ca

## 2020-05-08 NOTE — OCCUPATIONAL THERAPY INITIAL EVALUATION ADULT - GENERAL OBSERVATIONS, REHAB EVAL
Pt received supine in bed (+) KI to LLE, SCD to RLE, (+) IV, (+) tele, (+) 2L O2 via NC, (+) [place] cath.

## 2020-05-08 NOTE — PROGRESS NOTE ADULT - PROBLEM SELECTOR PROBLEM 2
HOSPITAL MEDICINE ADMISSION NOTE    Date of Service: 2017   Name: Joaquina Suarez   : 1966  MRN: [id number]  CSN: [id number]  Primary Care Physician: Pcp Pt States None    Chief Complaint  Chief Complaint   Patient presents with   • Possible Stroke       History of Present Illness  Joaquina Suarez is a 50 y.o. Male remote history of stroke with residual dysarthria years ago,  apparently with a history of HIV and low CD4 (not on Bactrim now), flown in from [facility] to our ED for worsenign dysarthria and ton of mosquito bites.  He was hiking, got bit by mosquitos, and stays at [facility]. He woke up early fatigued, tired on exertion, and more dysartrhic than his usual. Also has mosquito bites. He denied fever, chills, nausea, diarrhea, chest or abdominal pain or dysuria,. No headaches or lightheadedness, vision loss, hemiparesis.  At ED, Dr. Malone felt he wasn't a tPA candidate. He was pretty much neurologically nonfocal. CT head no bleed, showed previous craniotomy with associated encephalomalacia.  When I saw him at ED, he was in no acute distress. He has only a slight dysarthria to me. Neurologically he is nonfocal. He has plenty of mosquito bites, none of them vesicles or pustular, just slightly raised.    Home Medications  No current facility-administered medications on file prior to encounter.     No current outpatient prescriptions on file prior to encounter.   Emtricitabine-Tenofovir AF (DESCOVY) 200-25 MG Tab Take 1 Tab by mouth every day. Ankit Edmondson M.D. Reordered Ordered as: Emtricitabine-Tenofovir -25 MG TABS 1 Tab - 1 Tab DAILY, Oral, First dose on Sun 17 at 1415, Until Discontinued Pt own med Verified by pharmacy raltegravir (ISENTRESS) 400 MG Tab Take 400 mg by mouth 2 Times a Day. Ankit Edmondson M.D. Reordered Ordered as: raltegravir (ISENTRESS) tablet 400 mg - 400 mg 2 TIMES DAILY, Oral, First dose on Sun 17 at 1415, Until Discontinued darunavir (PREZISTA) 600  MG Tab Take 600 mg by mouth 2 times a day, with meals. Ankit Edmondson M.D. Reordered Ordered as: darunavir (PREZISTA) tablet 600 mg - 600 mg 2 TIMES DAILY WITH MEALS, Oral, First dose on Sun 7/9/17 at 1730, Until Discontinued Pt own med - pharmacy to verify med prior to first dose Administer with food. ritonavir (NORVIR) 100 MG Cap Take 100 mg by mouth 2 times a day, with meals. Ankit Edmondson M.D. Reordered Ordered as: ritonavir (NORVIR) tablet 100 mg - 100 mg 2 TIMES DAILY WITH MEALS, Oral, First dose on Sun 7/9/17 at 1730, Until Discontinued aspirin (ASA) 325 MG Tab Take 325 mg by mouth every day. Ankit Edmondson M.D. Reordered Ordered as: aspirin (ASA) tablet 325 mg - 325 mg DAILY, Oral, First dose on Sun 7/9/17 at 1415, Until Discontinued ibuprofen (MOTRIN) 200 MG Tab      Allergies  Pcn    Past Medical and Surgical History  HIV  Stroke?    Family History  No family history on file.Reviewed not pertinent.    Social History  Social History     Social History   • Marital Status: Single     Spouse Name: N/A   • Number of Children: N/A   • Years of Education: N/A     Occupational History   • Not on file.     Social History Main Topics   • Smoking status: Not on file   • Smokeless tobacco: Not on file   • Alcohol Use: Not on file   • Drug Use: Not on file   • Sexual Activity: Not on file     Other Topics Concern   • Not on file     Social History Narrative   • No narrative on file   Denied smoking and alcohol.    Review of Systems  ROS    General. No fevers or chills. Malaise.  Eyes: No vision loss.  ENT: No nasal congestion, epistaxis. No hearing loss.  Respiratory: No shortness of breath, productive coughing, wheezing. No hemoptysis.  Cardiovascular: No chest pain, palpitations, murmur or claudications. No orthopnea, exertional chest pain. No dyspnea on exertion.  Gastrointestinal: No nausea, vomiting, diarrhea, constipation. No abdominal pain.  Genitourinary: No dysuria. No incontinence.  Musoskeletal: No  "falls, myalgias or arthralgias.  Integumentary: Mosquito bites.  Heme: No obvious lymphadenopathy  Neurologic: No headaches, loss of consciousness or seizure activity.  Psychiatric: Stable mood. Not currently anxious or depressed.    Physical Exam  Filed Vitals:    07/09/17 0947   BP: 129/83   Pulse: 60   Temp: 36.7 °C (98.1 °F)   Resp: 20   Height: 1.753 m (5' 9.02\")   Weight: 79.379 kg (175 lb)       Physical Exam    General/Constitutional: No acute distress.   Head: Normocephalic, atraumatic  ENT: Oral mucosa is moist. No obvious pharyngeal exudates  Eyes: Pink conjunctiva, no scleral icterus  Neck: Supple, no lymphadenopathy  Cardiovascular: Normal rate and regular rhythm. S1,2 noted. No murmurs, gallops or rubs.  Pulmonary: Clear to auscultation bilaterally. No wheezes, rales or rhonchi.  Abdominal: Soft, nontender, not distended, bowel sounds normoactive. No guarding or peritoneal signs.  Musculoskeletal: No tenderness to palpation of chest wall.  Neurologic: Alert and oriented. Grossly nonfocal, moving all extremities. Slight dysarthria.  Genitourinary: No gross hematuria  Skin: Mosquito bites, maculopapular. no pustules.   Psychiatric: Pleasant, cooperative.  Vitals Reviewed  Labs Reviewed  Imaging reviewed  Nursing notes reviewed    Labs  Lab Results   Component Value Date/Time    WBC 3.2* 07/09/2017 10:04 AM    RBC 4.32* 07/09/2017 10:04 AM    HEMOGLOBIN 14.7 07/09/2017 10:04 AM    HEMATOCRIT 41.1* 07/09/2017 10:04 AM    MCV 95.1 07/09/2017 10:04 AM    MCH 34.0* 07/09/2017 10:04 AM    MCHC 35.8* 07/09/2017 10:04 AM    MPV 9.8 07/09/2017 10:04 AM    NEUTROPHILS-POLYS 54.10 07/09/2017 10:04 AM    LYMPHOCYTES 28.10 07/09/2017 10:04 AM    MONOCYTES 12.00 07/09/2017 10:04 AM    EOSINOPHILS 4.90 07/09/2017 10:04 AM    BASOPHILS 0.60 07/09/2017 10:04 AM      Lab Results   Component Value Date/Time    SODIUM 135 07/09/2017 10:04 AM    POTASSIUM 3.7 07/09/2017 10:04 AM    CHLORIDE 105 07/09/2017 10:04 AM    CO2 21 " 07/09/2017 10:04 AM    GLUCOSE 83 07/09/2017 10:04 AM    BUN 15 07/09/2017 10:04 AM    CREATININE 0.61 07/09/2017 10:04 AM      Lab Results   Component Value Date/Time    PT 13.4 07/09/2017 10:04 AM    INR 0.99 07/09/2017 10:04 AM        Imaging  Ct-head W/o    7/9/2017 7/9/2017 11:10 AM HISTORY/REASON FOR EXAM:  Neurological Deficit. History of brain surgery TECHNIQUE/EXAM DESCRIPTION AND NUMBER OF VIEWS: CT of the head without contrast. The study was performed on a helical multidetector CT scanner. Contiguous 2.5 mm axial sections were obtained from the skull base through the vertex. Up to date radiation dose reduction adjustments have been utilized to meet ALARA standards for radiation dose reduction. COMPARISON:  None available FINDINGS: Patient is status post right frontoparietal craniotomy. There is underlying encephalomalacia. No intracranial hemorrhage, midline shift or mass effect. No extra-axial fluid collections identified. There is ex vacuo dilatation of the right lateral ventricle. Paranasal sinuses in the field of view are unremarkable. Mastoids in the field of view are unremarkable.     7/9/2017  Status post right craniotomy with underlying encephalomalacia without evidence of acute intracranial process.    Dx-chest-portable (1 View)    7/9/2017 7/9/2017 11:04 AM HISTORY/REASON FOR EXAM:  Sepsis. Chest tightness. TECHNIQUE/EXAM DESCRIPTION AND NUMBER OF VIEWS: Single portable view of the chest. COMPARISON: None FINDINGS: Heart size is within normal limits. No pulmonary infiltrates or consolidations are noted. No pleural abnormalities are noted.     7/9/2017  No acute cardiac or pulmonary abnormalities are identified.      Assessment and Plan    Dysarthria, hitsory of stroke with residual dysarthria r/o CVA/TIA. Also r/o HIV related meningitis. For now ordered MRI and LP. Prefer LP to be IR guidedgiven that he may be viremic, and blind passes can put the  at risk for needlestick injury.   Hai consulted, did not recommend tPA    HIV  Dr. Ivan consulted.  LP ordered with LP panel including viruses, Lyme, culture, fungal studies  CD4 pending. HIV titer pending  Empiric coverage for HIV related meningitis will depend on CD4 count and as per ID.  If there are focal neurologic deficits or signs of meningitis (none now), Rocephin recommended to be started if indicated depending on results of studies (not now)    Mosquito bites. Recent hiking in the woods. Started empiric doxycycline.    I spent 73 minutes evaluating Joaquina Newyuval Suarez, reviewing the chart, vitals, labs and imaging, discussing the case with ED physician, Dr. Valles, Dr. Ivan, medication reconciliation, placing orders and enacting the plan above.    CC Pcp Pt States None     Viral pneumonia

## 2020-05-08 NOTE — OCCUPATIONAL THERAPY INITIAL EVALUATION ADULT - LEVEL OF INDEPENDENCE: TOILET, REHAB EVAL
not assessed due to decreased safety; pt with difficulty maintaining NWB LLE; [place] cath in place/unable to perform

## 2020-05-08 NOTE — PROGRESS NOTE ADULT - PROBLEM SELECTOR PLAN 10
Pt on home hospice  - discussion with daughter, Pati Cuba [phone] who is HCP  - Pt is DNR/DNI. Goal is for patient to be comfortable. MOLST completed and placed in chart.

## 2020-05-09 LAB
ALBUMIN SERPL ELPH-MCNC: 2.3 G/DL — LOW (ref 3.3–5)
ALP SERPL-CCNC: 166 U/L — HIGH (ref 40–120)
ALT FLD-CCNC: 14 U/L — SIGNIFICANT CHANGE UP (ref 12–78)
ANION GAP SERPL CALC-SCNC: 7 MMOL/L — SIGNIFICANT CHANGE UP (ref 5–17)
AST SERPL-CCNC: 20 U/L — SIGNIFICANT CHANGE UP (ref 15–37)
BASOPHILS # BLD AUTO: 0.01 K/UL — SIGNIFICANT CHANGE UP (ref 0–0.2)
BASOPHILS NFR BLD AUTO: 0.1 % — SIGNIFICANT CHANGE UP (ref 0–2)
BILIRUB SERPL-MCNC: 0.4 MG/DL — SIGNIFICANT CHANGE UP (ref 0.2–1.2)
BUN SERPL-MCNC: 19 MG/DL — SIGNIFICANT CHANGE UP (ref 7–23)
CALCIUM SERPL-MCNC: 8.5 MG/DL — SIGNIFICANT CHANGE UP (ref 8.5–10.1)
CHLORIDE SERPL-SCNC: 101 MMOL/L — SIGNIFICANT CHANGE UP (ref 96–108)
CO2 SERPL-SCNC: 30 MMOL/L — SIGNIFICANT CHANGE UP (ref 22–31)
CREAT SERPL-MCNC: 0.59 MG/DL — SIGNIFICANT CHANGE UP (ref 0.5–1.3)
EOSINOPHIL # BLD AUTO: 0.01 K/UL — SIGNIFICANT CHANGE UP (ref 0–0.5)
EOSINOPHIL NFR BLD AUTO: 0.1 % — SIGNIFICANT CHANGE UP (ref 0–6)
GLUCOSE SERPL-MCNC: 247 MG/DL — HIGH (ref 70–99)
HCT VFR BLD CALC: 26 % — LOW (ref 39–50)
HCT VFR BLD CALC: 28 % — LOW (ref 39–50)
HGB BLD-MCNC: 7.9 G/DL — LOW (ref 13–17)
HGB BLD-MCNC: 8.6 G/DL — LOW (ref 13–17)
IMM GRANULOCYTES NFR BLD AUTO: 0.5 % — SIGNIFICANT CHANGE UP (ref 0–1.5)
LYMPHOCYTES # BLD AUTO: 0.96 K/UL — LOW (ref 1–3.3)
LYMPHOCYTES # BLD AUTO: 8.4 % — LOW (ref 13–44)
MCHC RBC-ENTMCNC: 23.7 PG — LOW (ref 27–34)
MCHC RBC-ENTMCNC: 30.4 GM/DL — LOW (ref 32–36)
MCV RBC AUTO: 77.8 FL — LOW (ref 80–100)
MONOCYTES # BLD AUTO: 0.98 K/UL — HIGH (ref 0–0.9)
MONOCYTES NFR BLD AUTO: 8.5 % — SIGNIFICANT CHANGE UP (ref 2–14)
NEUTROPHILS # BLD AUTO: 9.46 K/UL — HIGH (ref 1.8–7.4)
NEUTROPHILS NFR BLD AUTO: 82.4 % — HIGH (ref 43–77)
NRBC # BLD: 0 /100 WBCS — SIGNIFICANT CHANGE UP (ref 0–0)
PLATELET # BLD AUTO: 326 K/UL — SIGNIFICANT CHANGE UP (ref 150–400)
POTASSIUM SERPL-MCNC: 3.5 MMOL/L — SIGNIFICANT CHANGE UP (ref 3.5–5.3)
POTASSIUM SERPL-SCNC: 3.5 MMOL/L — SIGNIFICANT CHANGE UP (ref 3.5–5.3)
PROT SERPL-MCNC: 5.8 G/DL — LOW (ref 6–8.3)
RBC # BLD: 3.34 M/UL — LOW (ref 4.2–5.8)
RBC # FLD: 17.5 % — HIGH (ref 10.3–14.5)
SODIUM SERPL-SCNC: 138 MMOL/L — SIGNIFICANT CHANGE UP (ref 135–145)
WBC # BLD: 11.48 K/UL — HIGH (ref 3.8–10.5)
WBC # FLD AUTO: 11.48 K/UL — HIGH (ref 3.8–10.5)

## 2020-05-09 PROCEDURE — 99232 SBSQ HOSP IP/OBS MODERATE 35: CPT

## 2020-05-09 RX ORDER — HALOPERIDOL DECANOATE 100 MG/ML
2 INJECTION INTRAMUSCULAR ONCE
Refills: 0 | Status: COMPLETED | OUTPATIENT
Start: 2020-05-09 | End: 2020-05-09

## 2020-05-09 RX ADMIN — Medication 500 MILLIGRAM(S): at 05:26

## 2020-05-09 RX ADMIN — PANTOPRAZOLE SODIUM 40 MILLIGRAM(S): 20 TABLET, DELAYED RELEASE ORAL at 05:26

## 2020-05-09 RX ADMIN — Medication 1 MILLIGRAM(S): at 12:00

## 2020-05-09 RX ADMIN — Medication 3: at 11:59

## 2020-05-09 RX ADMIN — Medication 3: at 08:30

## 2020-05-09 RX ADMIN — ENOXAPARIN SODIUM 40 MILLIGRAM(S): 100 INJECTION SUBCUTANEOUS at 05:26

## 2020-05-09 RX ADMIN — ONDANSETRON 4 MILLIGRAM(S): 8 TABLET, FILM COATED ORAL at 08:30

## 2020-05-09 RX ADMIN — Medication 1 TABLET(S): at 12:00

## 2020-05-09 RX ADMIN — FINASTERIDE 5 MILLIGRAM(S): 5 TABLET, FILM COATED ORAL at 12:00

## 2020-05-09 RX ADMIN — HALOPERIDOL DECANOATE 2 MILLIGRAM(S): 100 INJECTION INTRAMUSCULAR at 15:01

## 2020-05-09 RX ADMIN — TAMSULOSIN HYDROCHLORIDE 0.4 MILLIGRAM(S): 0.4 CAPSULE ORAL at 21:54

## 2020-05-09 RX ADMIN — Medication 0.25 MILLIGRAM(S): at 13:55

## 2020-05-09 RX ADMIN — POLYETHYLENE GLYCOL 3350 17 GRAM(S): 17 POWDER, FOR SOLUTION ORAL at 12:00

## 2020-05-09 NOTE — PROGRESS NOTE ADULT - PROBLEM SELECTOR PLAN 2
COVID PCR negative  Maintaining O2 sat RA  H/o lung CA unsure of baseline lung findings, elevated inflammatory markers could be due to chronic disease vs covid  Pulm (Dr Davila) recs appreciated -though COVID PCR negative, CXR on admission shows b/l interstitial and airspace infiltrates suggestive of atypical pneumonia/viral pneumonia including COVID 19  -treat as presumed covid   -maintaining sats on 2L NC  -Pulm (Dr Davila) recs appreciated

## 2020-05-09 NOTE — PROGRESS NOTE ADULT - SUBJECTIVE AND OBJECTIVE BOX
Patient seen and examined at bedside. No acute events over night. Patient minimally conversational on exam. Denies any acute complaints.     PE:  Vital Signs Last 24 Hrs  T(C): 36.5 (05-09-20 @ 08:00), Max: 37.1 (05-08-20 @ 21:10)  T(F): 97.7 (05-09-20 @ 08:00), Max: 98.8 (05-08-20 @ 21:10)  HR: 77 (05-09-20 @ 08:15) (77 - 82)  BP: 113/60 (05-09-20 @ 08:15) (101/55 - 126/48)  BP(mean): --  RR: 18 (05-09-20 @ 08:00) (18 - 19)  SpO2: 100% (05-09-20 @ 08:15) (93% - 100%)    General: NAD, resting comfortably in bed  L LE:   Dressing C/D/I in bulky pérez KI  SCDs present contralaterally  Compartments soft and compressible  No calf tenderness bilaterally  motor response to noxious stimuli  Unable to assess sensation 2/2 mental status  2+ DP/PT                          7.9    11.48 )-----------( 326      ( 09 May 2020 07:08 )             26.0     09 May 2020 07:08    138    |  101    |  19     ----------------------------<  247    3.5     |  30     |  0.59     Ca    8.5        09 May 2020 07:08    TPro  5.8    /  Alb  2.3    /  TBili  0.4    /  DBili  x      /  AST  20     /  ALT  14     /  AlkPhos  166    09 May 2020 07:08        A/P:  85y m s/p Distal femur ORIF POD2  -PT/OT - NWB on the LLE  -Pain Control  -DVT ppx w/lovenox  -FU AM Labs, downtrending H&H will repeat later and transfuse if necssary  -Rest, ice, compress and elevate the extremity as we needed  -Incentive Spirometry  -Medical management appreciated  -Dispo planing: Pt with presumed COVId + on home hospice, recommending d/c once medically stable  -Ortho stable for DC

## 2020-05-09 NOTE — PROGRESS NOTE ADULT - PROBLEM SELECTOR PLAN 3
Followup XR of b/l forearm, humerus, b/l tibia + fibula and R femur  - Ortho following- Dr. Silverman  - Plan as above #1 - XR of b/l forearm, humerus, b/l tibia + fibula and R femur negative for fx   - (+) left distal femur ppx pathological fx s/p ORIF   - Ortho following- Dr. Silverman  - Plan as above #1

## 2020-05-09 NOTE — PROGRESS NOTE ADULT - PROBLEM SELECTOR PLAN 1
85y Male POD 1 from ORIF L Distal Femur PPx Pathologic Fracture  ID eval noted  PT and OT eval noted -   DM care  mets ca - Lung Ca - neg covid on PCR  on o2 support - keep sat > 88 pct - use I yaima if able to cooperate  VS and HD and Sat -   pain rx regimen - Opioids on order - Bowel regimen on order -   supportive medical regimen  pt is DNR DNI  prognosis poor - pt was on Home Hospice -   dc plan will need to include consideration for PT and Hospice and Mets Lung Ca.

## 2020-05-09 NOTE — PROGRESS NOTE ADULT - SUBJECTIVE AND OBJECTIVE BOX
Date/Time Patient Seen:  		  Referring MD:   Data Reviewed	       Patient is a 85y old  Male who presents with a chief complaint of Destructive lytic lesion of left superior pubic ramus (08 May 2020 17:20)      Subjective/HPI     PAST MEDICAL & SURGICAL HISTORY:  BPH (benign prostatic hyperplasia)  TIA (transient ischemic attack)  Bone cancer due to spread from other site  Lung cancer  Dementia  Diabetes: not on insulin  H/O total knee replacement  History of cholecystectomy        Medication list         MEDICATIONS  (STANDING):  ascorbic acid 500 milliGRAM(s) Oral two times a day  dextrose 5%. 1000 milliLiter(s) (50 mL/Hr) IV Continuous <Continuous>  dextrose 50% Injectable 12.5 Gram(s) IV Push once  dextrose 50% Injectable 25 Gram(s) IV Push once  dextrose 50% Injectable 25 Gram(s) IV Push once  enoxaparin Injectable 40 milliGRAM(s) SubCutaneous every 24 hours  finasteride 5 milliGRAM(s) Oral daily  folic acid 1 milliGRAM(s) Oral daily  insulin lispro (HumaLOG) corrective regimen sliding scale   SubCutaneous three times a day before meals  insulin lispro (HumaLOG) corrective regimen sliding scale   SubCutaneous at bedtime  lactated ringers. 1000 milliLiter(s) (50 mL/Hr) IV Continuous <Continuous>  multivitamin 1 Tablet(s) Oral daily  pantoprazole    Tablet 40 milliGRAM(s) Oral before breakfast  polyethylene glycol 3350 17 Gram(s) Oral daily  tamsulosin 0.4 milliGRAM(s) Oral at bedtime    MEDICATIONS  (PRN):  acetaminophen   Tablet .. 650 milliGRAM(s) Oral every 6 hours PRN Temp greater or equal to 38C (100.4F), Mild Pain (1 - 3)  aluminum hydroxide/magnesium hydroxide/simethicone Suspension 30 milliLiter(s) Oral four times a day PRN Indigestion  bisacodyl Suppository 10 milliGRAM(s) Rectal daily PRN If no bowel movement by postoperative day #2  dextrose 40% Gel 15 Gram(s) Oral once PRN Blood Glucose LESS THAN 70 milliGRAM(s)/deciliter  glucagon  Injectable 1 milliGRAM(s) IntraMuscular once PRN Glucose LESS THAN 70 milligrams/deciliter  LORazepam    Concentrate 0.25 milliGRAM(s) Oral every 6 hours PRN Agitation  ondansetron Injectable 4 milliGRAM(s) IV Push every 6 hours PRN Nausea and/or Vomiting  oxyCODONE    IR 5 milliGRAM(s) Oral every 4 hours PRN Moderate Pain (4 - 6)  oxyCODONE    IR 10 milliGRAM(s) Oral every 4 hours PRN Severe Pain (7 - 10)  senna 2 Tablet(s) Oral at bedtime PRN Constipation         Vitals log        ICU Vital Signs Last 24 Hrs  T(C): 36.5 (09 May 2020 08:00), Max: 37.1 (08 May 2020 21:10)  T(F): 97.7 (09 May 2020 08:00), Max: 98.8 (08 May 2020 21:10)  HR: 77 (09 May 2020 08:15) (77 - 82)  BP: 113/60 (09 May 2020 08:15) (101/55 - 126/48)  BP(mean): --  ABP: --  ABP(mean): --  RR: 18 (09 May 2020 08:00) (18 - 19)  SpO2: 100% (09 May 2020 08:15) (93% - 100%)           Input and Output:  I&O's Detail    08 May 2020 07:01  -  09 May 2020 07:00  --------------------------------------------------------  IN:    lactated ringers.: 450 mL    Oral Fluid: 420 mL  Total IN: 870 mL    OUT:    Voided: 350 mL  Total OUT: 350 mL    Total NET: 520 mL          Lab Data                        7.9    11.48 )-----------( 326      ( 09 May 2020 07:08 )             26.0     05-09    138  |  101  |  19  ----------------------------<  247<H>  3.5   |  30  |  0.59    Ca    8.5      09 May 2020 07:08    TPro  5.8<L>  /  Alb  2.3<L>  /  TBili  0.4  /  DBili  x   /  AST  20  /  ALT  14  /  AlkPhos  166<H>  05-09            Review of Systems	      Objective     Physical Examination    heart s1s2  lung dec BS  abd soft  head nc  head at      Pertinent Lab findings & Imaging      Almendarez:  NO   Adequate UO     I&O's Detail    08 May 2020 07:01  -  09 May 2020 07:00  --------------------------------------------------------  IN:    lactated ringers.: 450 mL    Oral Fluid: 420 mL  Total IN: 870 mL    OUT:    Voided: 350 mL  Total OUT: 350 mL    Total NET: 520 mL               Discussed with:     Cultures:	        Radiology

## 2020-05-09 NOTE — PROGRESS NOTE ADULT - PROBLEM SELECTOR PLAN 10
Pt on home hospice  - discussion with daughter, Pati Cuba [phone] who is HCP  - Pt is DNR/DNI. Goal is for patient to be comfortable. MOLST completed and placed in chart.

## 2020-05-09 NOTE — PROGRESS NOTE ADULT - PROBLEM SELECTOR PLAN 1
- s/p ORIF L Distal Femur PPx Pathologic Fracture POD #2  - Ortho following (Dr. Silverman)  - Pain management per surgery  - PT  - fall risk  - miralax and senna while on opiates  - IVF  - has home hospice - s/p ORIF L Distal Femur Pathological Fracture POD #2  - downtrending H/H, will repeat in afternoon and transfuse if needed, hemodynamically stable   - pain mgmt, dvt ppx as per ortho   - PT  - miralax and senna while on opiates

## 2020-05-09 NOTE — PROGRESS NOTE ADULT - SUBJECTIVE AND OBJECTIVE BOX
Patient is a 85y old  Male who presents with a chief complaint of Destructive lytic lesion of left superior pubic ramus (09 May 2020 10:29)      FROM ADMISSION H+P:   HPI:  84 y/o male with PMHx Lung cancer with mets to bones (dx 8/2019) on home hospice, BPH, Dementia, Non-insulin dependent T2DM, hx TIA BIBA from home due to fall out of bed. Patient unable to give history due to dementia. History obtained from Pati Cuba ([phone] cell). Pt fell on 5/1/20 out of bed and fell again on 5/4. States that home hospice was unable to have patient receive outpatient XR and was advised to go to hospital. Patient occasionally ambulates and has been bedridden for the last week. Patient has been taking Percocet and Ativan ATC for pain but due to dementia, daughter unable to gauge his pain level. States that patient had a dry cough about a month ago and occasionally takes albuterol inhaler when he appears sob. Denies fevers/chills, n/v, diarrhea, abdominal pain, recent travel or COVID exposure. Of note, patient has not had any treatment for metastatic lung cancer due to rapid progression and onset of dementia.    In the ED, patient's vitals were: /71, HR 77, RR 16 and SpO2 95% on room air. Significant labs include: wbc 10.51, H/H 10.3/33.1, MCV 76.4, Na 133.   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 No midline shift or hydrocephalus. Mildly motion limited study.  L XR hip/pelvis: Destructive lytic lesion involving the left superior pubic ramus. Sclerosis involving the left inferior pubic ramus. The findings are likely metastatic. Cannot rule out underlying pathologic fracture. Consider CT scan for additional diagnostic benefit.  Comminuted displaced angulated distal left femoral shaft fracture just above the femoral component of a bipolar knee prosthesis. The fracture may be pathologic.   CXR: Bilateral interstitial and airspace infiltrates suggestive of atypical pneumonia/viral pneumonia including COVID 19.   CT LLE and pelvis pending results  EKG: SR with APCs, HR 85 (05 May 2020 21:27)      ----  INTERVAL HPI/OVERNIGHT EVENTS: Pt seen and evaluated at the bedside. No acute overnight events occurred. Pt resting comfortably in bed. Pt is confused, hx of advanced dementia. Noted drop in H/H. Spoke to ortho resident who changed dressing, no significant hematoma or ecchymosis out of what would be expected post-operatively.     Difficult to obtain ROS due to dementia.     ----  PAST MEDICAL & SURGICAL HISTORY:  BPH (benign prostatic hyperplasia)  TIA (transient ischemic attack)  Bone cancer due to spread from other site  Lung cancer  Dementia  Diabetes: not on insulin  H/O total knee replacement  History of cholecystectomy      FAMILY HISTORY:  No pertinent family history in first degree relatives      Allergies    No Known Allergies    Intolerances        ----  REVIEW OF SYSTEMS:  as per HPI    ----  PHYSICAL EXAM:  GENERAL: patient appears well, no acute distress, speaking in complete non-coherent sentences, nontoxic  EYES: sclera clear without erythema, no exudates  ENMT: oropharynx clear without erythema, moist mucous membranes  LUNGS: good air entry bilaterally, clear to auscultation, symmetric breath sounds, no wheezing or rhonchi appreciated  HEART: soft S1/S2, regular rate and rhythm, no murmurs noted, no noted edema to b/l LE  GASTROINTESTINAL: abdomen is soft, nontender, nondistended, normoactive bowel sounds, no palpable masses  INTEGUMENT: good skin turgor, appropriate for ethnicity, appears well perfused, no jaundice noted  MUSCULOSKELETAL: no clubbing or cyanosis, no obvious deformity, left leg in immobilizer, bandage over left hip C/D/I, underlying ecchymosis noted   NEUROLOGIC: alert and awake but confused, no obvious focal neurological deficits     T(C): 36.5 (05-09-20 @ 08:00), Max: 37.1 (05-08-20 @ 21:10)  HR: 77 (05-09-20 @ 08:15) (77 - 82)  BP: 113/60 (05-09-20 @ 08:15) (101/55 - 126/48)  RR: 18 (05-09-20 @ 08:00) (18 - 19)  SpO2: 100% (05-09-20 @ 08:15) (93% - 100%)  Wt(kg): --    ----  I&O's Summary    08 May 2020 07:01  -  09 May 2020 07:00  --------------------------------------------------------  IN: 870 mL / OUT: 350 mL / NET: 520 mL        LABS:                        7.9    11.48 )-----------( 326      ( 09 May 2020 07:08 )             26.0     05-09    138  |  101  |  19  ----------------------------<  247<H>  3.5   |  30  |  0.59    Ca    8.5      09 May 2020 07:08    TPro  5.8<L>  /  Alb  2.3<L>  /  TBili  0.4  /  DBili  x   /  AST  20  /  ALT  14  /  AlkPhos  166<H>  05-09        CAPILLARY BLOOD GLUCOSE      POCT Blood Glucose.: 277 mg/dL (09 May 2020 11:41)  POCT Blood Glucose.: 258 mg/dL (09 May 2020 08:10)  POCT Blood Glucose.: 172 mg/dL (08 May 2020 20:44)  POCT Blood Glucose.: 264 mg/dL (08 May 2020 17:21)                ----  Personally reviewed:  Vital sign trends: [  ] yes    [  ] no     [  ] n/a  Laboratory results: [  ] yes    [  ] no     [  ] n/a  Radiology results: [  ] yes    [  ] no     [  ] n/a  Culture results: [  ] yes    [  ] no     [  ] n/a  Consultant recommendations: [  ] yes    [  ] no     [  ] n/a

## 2020-05-09 NOTE — PROVIDER CONTACT NOTE (OTHER) - ACTION/TREATMENT ORDERED:
PRN ativan po given. Pt kept clean and comfortable. Dr Chen made aware. Awaiting orders at this time.

## 2020-05-10 LAB
ALBUMIN SERPL ELPH-MCNC: 2.2 G/DL — LOW (ref 3.3–5)
ALP SERPL-CCNC: 178 U/L — HIGH (ref 40–120)
ALT FLD-CCNC: 13 U/L — SIGNIFICANT CHANGE UP (ref 12–78)
ANION GAP SERPL CALC-SCNC: 5 MMOL/L — SIGNIFICANT CHANGE UP (ref 5–17)
AST SERPL-CCNC: 25 U/L — SIGNIFICANT CHANGE UP (ref 15–37)
BASOPHILS # BLD AUTO: 0.03 K/UL — SIGNIFICANT CHANGE UP (ref 0–0.2)
BASOPHILS NFR BLD AUTO: 0.3 % — SIGNIFICANT CHANGE UP (ref 0–2)
BILIRUB SERPL-MCNC: 0.5 MG/DL — SIGNIFICANT CHANGE UP (ref 0.2–1.2)
BUN SERPL-MCNC: 12 MG/DL — SIGNIFICANT CHANGE UP (ref 7–23)
CALCIUM SERPL-MCNC: 8.7 MG/DL — SIGNIFICANT CHANGE UP (ref 8.5–10.1)
CHLORIDE SERPL-SCNC: 104 MMOL/L — SIGNIFICANT CHANGE UP (ref 96–108)
CO2 SERPL-SCNC: 31 MMOL/L — SIGNIFICANT CHANGE UP (ref 22–31)
CREAT SERPL-MCNC: 0.44 MG/DL — LOW (ref 0.5–1.3)
EOSINOPHIL # BLD AUTO: 0.06 K/UL — SIGNIFICANT CHANGE UP (ref 0–0.5)
EOSINOPHIL NFR BLD AUTO: 0.6 % — SIGNIFICANT CHANGE UP (ref 0–6)
GLUCOSE SERPL-MCNC: 119 MG/DL — HIGH (ref 70–99)
HCT VFR BLD CALC: 27.8 % — LOW (ref 39–50)
HGB BLD-MCNC: 8.5 G/DL — LOW (ref 13–17)
IMM GRANULOCYTES NFR BLD AUTO: 0.9 % — SIGNIFICANT CHANGE UP (ref 0–1.5)
LYMPHOCYTES # BLD AUTO: 1.33 K/UL — SIGNIFICANT CHANGE UP (ref 1–3.3)
LYMPHOCYTES # BLD AUTO: 13.2 % — SIGNIFICANT CHANGE UP (ref 13–44)
MCHC RBC-ENTMCNC: 23.5 PG — LOW (ref 27–34)
MCHC RBC-ENTMCNC: 30.6 GM/DL — LOW (ref 32–36)
MCV RBC AUTO: 77 FL — LOW (ref 80–100)
MONOCYTES # BLD AUTO: 1 K/UL — HIGH (ref 0–0.9)
MONOCYTES NFR BLD AUTO: 9.9 % — SIGNIFICANT CHANGE UP (ref 2–14)
NEUTROPHILS # BLD AUTO: 7.6 K/UL — HIGH (ref 1.8–7.4)
NEUTROPHILS NFR BLD AUTO: 75.1 % — SIGNIFICANT CHANGE UP (ref 43–77)
NRBC # BLD: 0 /100 WBCS — SIGNIFICANT CHANGE UP (ref 0–0)
PLATELET # BLD AUTO: 308 K/UL — SIGNIFICANT CHANGE UP (ref 150–400)
POTASSIUM SERPL-MCNC: 3.5 MMOL/L — SIGNIFICANT CHANGE UP (ref 3.5–5.3)
POTASSIUM SERPL-SCNC: 3.5 MMOL/L — SIGNIFICANT CHANGE UP (ref 3.5–5.3)
PROT SERPL-MCNC: 6 G/DL — SIGNIFICANT CHANGE UP (ref 6–8.3)
RBC # BLD: 3.61 M/UL — LOW (ref 4.2–5.8)
RBC # FLD: 17.3 % — HIGH (ref 10.3–14.5)
SODIUM SERPL-SCNC: 140 MMOL/L — SIGNIFICANT CHANGE UP (ref 135–145)
WBC # BLD: 10.11 K/UL — SIGNIFICANT CHANGE UP (ref 3.8–10.5)
WBC # FLD AUTO: 10.11 K/UL — SIGNIFICANT CHANGE UP (ref 3.8–10.5)

## 2020-05-10 PROCEDURE — 99232 SBSQ HOSP IP/OBS MODERATE 35: CPT

## 2020-05-10 RX ADMIN — OXYCODONE HYDROCHLORIDE 5 MILLIGRAM(S): 5 TABLET ORAL at 10:26

## 2020-05-10 RX ADMIN — ENOXAPARIN SODIUM 40 MILLIGRAM(S): 100 INJECTION SUBCUTANEOUS at 05:27

## 2020-05-10 RX ADMIN — Medication 1 MILLIGRAM(S): at 10:26

## 2020-05-10 RX ADMIN — Medication 1 MILLIGRAM(S): at 17:39

## 2020-05-10 RX ADMIN — POLYETHYLENE GLYCOL 3350 17 GRAM(S): 17 POWDER, FOR SOLUTION ORAL at 10:33

## 2020-05-10 RX ADMIN — OXYCODONE HYDROCHLORIDE 10 MILLIGRAM(S): 5 TABLET ORAL at 19:29

## 2020-05-10 RX ADMIN — Medication 500 MILLIGRAM(S): at 17:39

## 2020-05-10 RX ADMIN — Medication 1: at 12:15

## 2020-05-10 RX ADMIN — OXYCODONE HYDROCHLORIDE 10 MILLIGRAM(S): 5 TABLET ORAL at 13:53

## 2020-05-10 RX ADMIN — Medication 1 TABLET(S): at 10:26

## 2020-05-10 RX ADMIN — FINASTERIDE 5 MILLIGRAM(S): 5 TABLET, FILM COATED ORAL at 10:26

## 2020-05-10 RX ADMIN — ONDANSETRON 4 MILLIGRAM(S): 8 TABLET, FILM COATED ORAL at 09:45

## 2020-05-10 RX ADMIN — Medication 0.25 MILLIGRAM(S): at 00:52

## 2020-05-10 NOTE — PROGRESS NOTE ADULT - PROBLEM SELECTOR PLAN 1
- s/p ORIF L Distal Femur Pathological Fracture POD #2  -H/H stable  - pain mgmt, dvt ppx as per ortho   - PT - s/p ORIF L Distal Femur Pathological Fracture POD #3  -H/H stable  -lovenox for dvt ppx for 1 month as per ortho   -PT: home with assist   -pain management

## 2020-05-10 NOTE — PROGRESS NOTE ADULT - PROBLEM SELECTOR PLAN 2
-though COVID PCR negative, CXR on admission shows b/l interstitial and airspace infiltrates suggestive of atypical pneumonia/viral pneumonia including COVID 19  -treat as presumed covid   -maintaining sats on 2L NC  -Pulm (Dr Davila) recs appreciated -though COVID PCR negative, CXR on admission shows b/l interstitial and airspace infiltrates suggestive of atypical pneumonia/viral pneumonia including COVID 19  -treat as presumed covid   -patient will need home oxygen 2L via NC, to be provided by home hospice  -Pulm (Dr Davila) recs appreciated

## 2020-05-10 NOTE — PROGRESS NOTE ADULT - PROBLEM SELECTOR PLAN 4
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 Mauri White at [facility]  - Pt on home hospice   - oxygen supplementation prn Lung ca diagnosed 8/2019 with metastasis to bone  - Pt received no treatment due to advanced disease and onset of dementia  - Oncologist- Dr. Mauri White at [place]  - Pt on home hospice  - oxygen supplementation prn  - patient medically stable for d/c, awaiting reinstatement of home hospice, likely d/c in AM

## 2020-05-10 NOTE — PROGRESS NOTE ADULT - PROBLEM SELECTOR PLAN 1
85y Male POD 2 from ORIF L Distal Femur PPx Pathologic Fracture  enc PO intake and dec IVF as tolerated -   ID eval noted  PT and OT eval noted -   DM care  mets ca - Lung Ca - neg covid on PCR  on o2 support - keep sat > 88 pct - use I yaima if able to cooperate  VS and HD and Sat -   pain rx regimen - Opioids on order - Bowel regimen on order -   supportive medical regimen  pt is DNR DNI  prognosis poor - pt was on Home Hospice -   dc plan will need to include consideration for PT and Hospice and Mets Lung Ca.

## 2020-05-10 NOTE — PROGRESS NOTE ADULT - ASSESSMENT
84 y/o male with PMHx Lung cancer with mets to bones, BPH, Dementia, Non-insulin dependent T2DM, Lung cancer, TIA BIBA from home due to fall out of bed. Found to have destructive lytic lesion involving the left superior pubic ramus, likely metastatic pathologic fracture; s/p ORIF L Distal Femur PPx Pathologic Fracture 5/7/20. 84 y/o male with PMHx Lung cancer with mets to bones, BPH, Dementia, Non-insulin dependent T2DM, Lung cancer, TIA BIBA from home due to fall out of bed. Found to have destructive lytic lesion involving the left superior pubic ramus, likely metastatic pathologic fracture; s/p ORIF L Distal Femur PPx Pathologic Fracture 5/7/20. Medically stable for d/c pending reinstatement of home hospice.

## 2020-05-10 NOTE — PROGRESS NOTE ADULT - SUBJECTIVE AND OBJECTIVE BOX
Date/Time Patient Seen:  		  Referring MD:   Data Reviewed	       Patient is a 85y old  Male who presents with a chief complaint of Destructive lytic lesion of left superior pubic ramus (09 May 2020 12:21)      Subjective/HPI     PAST MEDICAL & SURGICAL HISTORY:  BPH (benign prostatic hyperplasia)  TIA (transient ischemic attack)  Bone cancer due to spread from other site  Lung cancer  Dementia  Diabetes: not on insulin  H/O total knee replacement  History of cholecystectomy        Medication list         MEDICATIONS  (STANDING):  ascorbic acid 500 milliGRAM(s) Oral two times a day  dextrose 5%. 1000 milliLiter(s) (50 mL/Hr) IV Continuous <Continuous>  dextrose 50% Injectable 12.5 Gram(s) IV Push once  dextrose 50% Injectable 25 Gram(s) IV Push once  dextrose 50% Injectable 25 Gram(s) IV Push once  enoxaparin Injectable 40 milliGRAM(s) SubCutaneous every 24 hours  finasteride 5 milliGRAM(s) Oral daily  folic acid 1 milliGRAM(s) Oral daily  insulin lispro (HumaLOG) corrective regimen sliding scale   SubCutaneous three times a day before meals  insulin lispro (HumaLOG) corrective regimen sliding scale   SubCutaneous at bedtime  lactated ringers. 1000 milliLiter(s) (50 mL/Hr) IV Continuous <Continuous>  multivitamin 1 Tablet(s) Oral daily  pantoprazole    Tablet 40 milliGRAM(s) Oral before breakfast  polyethylene glycol 3350 17 Gram(s) Oral daily  tamsulosin 0.4 milliGRAM(s) Oral at bedtime    MEDICATIONS  (PRN):  acetaminophen   Tablet .. 650 milliGRAM(s) Oral every 6 hours PRN Temp greater or equal to 38C (100.4F), Mild Pain (1 - 3)  aluminum hydroxide/magnesium hydroxide/simethicone Suspension 30 milliLiter(s) Oral four times a day PRN Indigestion  bisacodyl Suppository 10 milliGRAM(s) Rectal daily PRN If no bowel movement by postoperative day #2  dextrose 40% Gel 15 Gram(s) Oral once PRN Blood Glucose LESS THAN 70 milliGRAM(s)/deciliter  glucagon  Injectable 1 milliGRAM(s) IntraMuscular once PRN Glucose LESS THAN 70 milligrams/deciliter  ondansetron Injectable 4 milliGRAM(s) IV Push every 6 hours PRN Nausea and/or Vomiting  oxyCODONE    IR 5 milliGRAM(s) Oral every 4 hours PRN Moderate Pain (4 - 6)  oxyCODONE    IR 10 milliGRAM(s) Oral every 4 hours PRN Severe Pain (7 - 10)  senna 2 Tablet(s) Oral at bedtime PRN Constipation         Vitals log        ICU Vital Signs Last 24 Hrs  T(C): 36.5 (10 May 2020 05:15), Max: 36.7 (09 May 2020 21:08)  T(F): 97.7 (10 May 2020 05:15), Max: 98.1 (09 May 2020 21:08)  HR: 72 (10 May 2020 05:15) (72 - 86)  BP: 129/49 (10 May 2020 05:15) (100/52 - 130/55)  BP(mean): --  ABP: --  ABP(mean): --  RR: 18 (10 May 2020 05:15) (18 - 18)  SpO2: 98% (10 May 2020 05:15) (93% - 98%)           Input and Output:  I&O's Detail    09 May 2020 07:01  -  10 May 2020 07:00  --------------------------------------------------------  IN:    Oral Fluid: 240 mL  Total IN: 240 mL    OUT:    Voided: 300 mL  Total OUT: 300 mL    Total NET: -60 mL          Lab Data                        8.6    x     )-----------( x        ( 09 May 2020 14:32 )             28.0     05-09    138  |  101  |  19  ----------------------------<  247<H>  3.5   |  30  |  0.59    Ca    8.5      09 May 2020 07:08    TPro  5.8<L>  /  Alb  2.3<L>  /  TBili  0.4  /  DBili  x   /  AST  20  /  ALT  14  /  AlkPhos  166<H>  05-09            Review of Systems	      Objective     Physical Examination    heart s1s2  lung dec BS  abd soft      Pertinent Lab findings & Imaging      Erickson:  NO   Adequate UO     I&O's Detail    09 May 2020 07:01  -  10 May 2020 07:00  --------------------------------------------------------  IN:    Oral Fluid: 240 mL  Total IN: 240 mL    OUT:    Voided: 300 mL  Total OUT: 300 mL    Total NET: -60 mL               Discussed with:     Cultures:	        Radiology

## 2020-05-10 NOTE — PROGRESS NOTE ADULT - PROBLEM SELECTOR PLAN 7
Chronic, stable  - reorient as needed, ativan PRN for agitation, currently on 1:1  - Caution with BEERs medications  - poor PO intake, d/w daughter takes regular diabetic diet at home with kasandra bailey- d/w speech/swallow not candidate for eval as he is home hospice patient

## 2020-05-10 NOTE — PROGRESS NOTE ADULT - PROBLEM/PLAN-4
DISPLAY PLAN FREE TEXT DISPLAY PLAN FREE TEXT DISPLAY PLAN FREE TEXT DISPLAY PLAN FREE TEXT DISPLAY PLAN FREE TEXT DISPLAY PLAN FREE TEXT DISPLAY PLAN FREE TEXT DISPLAY PLAN FREE TEXT

## 2020-05-10 NOTE — PROGRESS NOTE ADULT - PROBLEM SELECTOR PLAN 10
Pt on home hospice  - discussion with daughter, Pati Cuba [phone] who is HCP  - Pt is DNR/DNI. Goal is for patient to be comfortable. MOLST completed and placed in chart.

## 2020-05-10 NOTE — PROGRESS NOTE ADULT - SUBJECTIVE AND OBJECTIVE BOX
Patient is a 85y old  Male who presents with a chief complaint of Destructive lytic lesion of left superior pubic ramus (10 May 2020 09:59)      FROM ADMISSION H+P:   HPI:  86 y/o male with PMHx Lung cancer with mets to bones (dx 8/2019) on home hospice, BPH, Dementia, Non-insulin dependent T2DM, hx TIA BIBA from home due to fall out of bed. Patient unable to give history due to dementia. History obtained from Pati Cuba ([phone] cell). Pt fell on 5/1/20 out of bed and fell again on 5/4. States that home hospice was unable to have patient receive outpatient XR and was advised to go to hospital. Patient occasionally ambulates and has been bedridden for the last week. Patient has been taking Percocet and Ativan ATC for pain but due to dementia, daughter unable to gauge his pain level. States that patient had a dry cough about a month ago and occasionally takes albuterol inhaler when he appears sob. Denies fevers/chills, n/v, diarrhea, abdominal pain, recent travel or COVID exposure. Of note, patient has not had any treatment for metastatic lung cancer due to rapid progression and onset of dementia.    In the ED, patient's vitals were: /71, HR 77, RR 16 and SpO2 95% on room air. Significant labs include: wbc 10.51, H/H 10.3/33.1, MCV 76.4, Na 133.   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 No midline shift or hydrocephalus. Mildly motion limited study.  L XR hip/pelvis: Destructive lytic lesion involving the left superior pubic ramus. Sclerosis involving the left inferior pubic ramus. The findings are likely metastatic. Cannot rule out underlying pathologic fracture. Consider CT scan for additional diagnostic benefit.  Comminuted displaced angulated distal left femoral shaft fracture just above the femoral component of a bipolar knee prosthesis. The fracture may be pathologic.   CXR: Bilateral interstitial and airspace infiltrates suggestive of atypical pneumonia/viral pneumonia including COVID 19.   CT LLE and pelvis pending results  EKG: SR with APCs, HR 85 (05 May 2020 21:27)      ----  INTERVAL HPI/OVERNIGHT EVENTS: Pt seen and evaluated at the bedside. Resting comfortably in bed. Checked room sat 89%, placed on 2L and improved to 95%. Pt was agitated in the evening, received ativan IVP. On 1:1. AAOx1 as per baseline.     Fever or chills: yes [  ]   no [  ]  Fatigue, malaise or generalized weakness: yes [  ]   no [  ]  Chest pain: yes [  ]   no [  ]  Palpitations: yes [  ]   no [  ]  Shortness of breath/dyspnea: yes [  ]   no [  ]  Cough: yes [  ]   no [  ], sputum production: yes [  ]   no [  ]  Anorexia/po intolerance: yes [  ]   no [  ]  Myalgias: yes [  ]   no [  ]  Headache: yes [  ]   no [  ]  Sore throat: yes [  ]   no [  ]  Rhinorrhea: yes [  ]   no [  ]  Nausea: yes [  ]   no [  ]  Vomiting: yes [  ]   no [  ]  Diarrhea: yes [  ]   no [  ]  Loss of sense of smell/anosmia: yes [  ]   no [  ]  Conjunctivitis: yes [  ]   no [  ]  Other associated complaints: unable to obtain due to dementia and [place]     ----  PAST MEDICAL & SURGICAL HISTORY:  BPH (benign prostatic hyperplasia)  TIA (transient ischemic attack)  Bone cancer due to spread from other site  Lung cancer  Dementia  Diabetes: not on insulin  H/O total knee replacement  History of cholecystectomy      FAMILY HISTORY:  No pertinent family history in first degree relatives      Allergies    No Known Allergies    Intolerances        ----  REVIEW OF SYSTEMS:  as per HPI    ----  PHYSICAL EXAM:  GENERAL: patient appears well, no acute distress, elderly, nontoxic  EYES: sclera clear without erythema, no exudates  ENMT: oropharynx clear without erythema, moist mucous membranes  LUNGS: decreased breath sounds b/l, no wheezing or rhonchi appreciated  HEART: soft S1/S2, regular rate and rhythm, no murmurs noted, no noted edema to b/l LE  GASTROINTESTINAL: abdomen is soft, nontender, nondistended, normoactive bowel sounds, no palpable masses  INTEGUMENT: good skin turgor, appropriate for ethnicity, appears well perfused, no jaundice noted  MUSCULOSKELETAL: no clubbing or cyanosis, no obvious deformity, (+) left leg in immobilizer, left hip dressing C/D/I, underlying ecchymoses noted   NEUROLOGIC: awake, alert, oriented x1, no obvious focal neurological deficits     T(C): 36.3 (05-10-20 @ 09:25), Max: 36.7 (05-09-20 @ 21:08)  HR: 68 (05-10-20 @ 09:25) (68 - 86)  BP: 128/66 (05-10-20 @ 09:25) (100/52 - 130/55)  RR: 18 (05-10-20 @ 05:15) (18 - 18)  SpO2: 97% (05-10-20 @ 09:25) (89% - 98%)  Wt(kg): --    ----  I&O's Summary    09 May 2020 07:01  -  10 May 2020 07:00  --------------------------------------------------------  IN: 240 mL / OUT: 300 mL / NET: -60 mL        LABS:                        8.5    10.11 )-----------( 308      ( 10 May 2020 10:34 )             27.8     05-10    140  |  104  |  12  ----------------------------<  119<H>  3.5   |  31  |  0.44<L>    Ca    8.7      10 May 2020 10:34    TPro  6.0  /  Alb  2.2<L>  /  TBili  0.5  /  DBili  x   /  AST  25  /  ALT  13  /  AlkPhos  178<H>  05-10        CAPILLARY BLOOD GLUCOSE      POCT Blood Glucose.: 148 mg/dL (10 May 2020 07:41)  POCT Blood Glucose.: 189 mg/dL (09 May 2020 21:51)  POCT Blood Glucose.: 168 mg/dL (09 May 2020 17:19)  POCT Blood Glucose.: 277 mg/dL (09 May 2020 11:41)                ----  Personally reviewed:  Vital sign trends: [  ] yes    [  ] no     [  ] n/a  Laboratory results: [  ] yes    [  ] no     [  ] n/a  Radiology results: [  ] yes    [  ] no     [  ] n/a  Culture results: [  ] yes    [  ] no     [  ] n/a  Consultant recommendations: [  ] yes    [  ] no     [  ] n/a

## 2020-05-10 NOTE — PROGRESS NOTE ADULT - SUBJECTIVE AND OBJECTIVE BOX
Patient seen and examined at bedside. No acute events over night. Per RN, patient agitated requiring ativan and 1:1. Patient minimally conversational on exam. Denies any acute complaints.     PE:  Vital Signs Last 24 Hrs  T(C): 36.3 (10 May 2020 09:25), Max: 36.7 (09 May 2020 21:08)  T(F): 97.4 (10 May 2020 09:25), Max: 98.1 (09 May 2020 21:08)  HR: 68 (10 May 2020 09:25) (68 - 86)  BP: 128/66 (10 May 2020 09:25) (100/52 - 130/55)  BP(mean): --  RR: 18 (10 May 2020 05:15) (18 - 18)  SpO2: 97% (10 May 2020 09:25) (89% - 98%)    General: NAD, resting comfortably in bed  L LE:   Dressing C/D/I in bulky pérez KI  SCDs present contralaterally  Compartments soft and compressible  No calf tenderness bilaterally  motor response to noxious stimuli  Unable to assess sensation 2/2 mental status  2+ DP/PT                          7.9    11.48 )-----------( 326      ( 09 May 2020 07:08 )             26.0     09 May 2020 07:08    138    |  101    |  19     ----------------------------<  247    3.5     |  30     |  0.59     Ca    8.5        09 May 2020 07:08    TPro  5.8    /  Alb  2.3    /  TBili  0.4    /  DBili  x      /  AST  20     /  ALT  14     /  AlkPhos  166    09 May 2020 07:08        A/P:  85y m s/p Distal femur ORIF POD3  -PT/OT - NWB on the LLE  -Pain Control  -DVT ppx w/lovenox  -FU AM Labs, H&H satble  -Rest, ice, compress and elevate the extremity as we needed  -Incentive Spirometry  -Medical management appreciated  -Dispo planing: Pt with presumed COVId + on home hospice, recommending d/c once medically stable and can be safely discharged home on home hospice  -Ortho stable for DC

## 2020-05-10 NOTE — PROGRESS NOTE ADULT - PROBLEM SELECTOR PLAN 3
- XR of b/l forearm, humerus, b/l tibia + fibula and R femur negative for fx   - (+) left distal femur ppx pathological fx s/p ORIF   - Ortho following- Dr. Silverman  - Plan as above #1

## 2020-05-11 VITALS
TEMPERATURE: 99 F | HEART RATE: 79 BPM | SYSTOLIC BLOOD PRESSURE: 130 MMHG | RESPIRATION RATE: 18 BRPM | DIASTOLIC BLOOD PRESSURE: 62 MMHG | OXYGEN SATURATION: 93 %

## 2020-05-11 LAB
ALBUMIN SERPL ELPH-MCNC: 2.4 G/DL — LOW (ref 3.3–5)
ALP SERPL-CCNC: 181 U/L — HIGH (ref 40–120)
ALT FLD-CCNC: 13 U/L — SIGNIFICANT CHANGE UP (ref 12–78)
ANION GAP SERPL CALC-SCNC: 6 MMOL/L — SIGNIFICANT CHANGE UP (ref 5–17)
AST SERPL-CCNC: 22 U/L — SIGNIFICANT CHANGE UP (ref 15–37)
BASOPHILS # BLD AUTO: 0.03 K/UL — SIGNIFICANT CHANGE UP (ref 0–0.2)
BASOPHILS NFR BLD AUTO: 0.3 % — SIGNIFICANT CHANGE UP (ref 0–2)
BILIRUB SERPL-MCNC: 0.5 MG/DL — SIGNIFICANT CHANGE UP (ref 0.2–1.2)
BUN SERPL-MCNC: 12 MG/DL — SIGNIFICANT CHANGE UP (ref 7–23)
CALCIUM SERPL-MCNC: 9.2 MG/DL — SIGNIFICANT CHANGE UP (ref 8.5–10.1)
CHLORIDE SERPL-SCNC: 103 MMOL/L — SIGNIFICANT CHANGE UP (ref 96–108)
CO2 SERPL-SCNC: 32 MMOL/L — HIGH (ref 22–31)
CREAT SERPL-MCNC: 0.49 MG/DL — LOW (ref 0.5–1.3)
EOSINOPHIL # BLD AUTO: 0.11 K/UL — SIGNIFICANT CHANGE UP (ref 0–0.5)
EOSINOPHIL NFR BLD AUTO: 1.2 % — SIGNIFICANT CHANGE UP (ref 0–6)
GLUCOSE SERPL-MCNC: 141 MG/DL — HIGH (ref 70–99)
HCT VFR BLD CALC: 26.5 % — LOW (ref 39–50)
HGB BLD-MCNC: 8 G/DL — LOW (ref 13–17)
IMM GRANULOCYTES NFR BLD AUTO: 0.8 % — SIGNIFICANT CHANGE UP (ref 0–1.5)
LYMPHOCYTES # BLD AUTO: 1.11 K/UL — SIGNIFICANT CHANGE UP (ref 1–3.3)
LYMPHOCYTES # BLD AUTO: 12.2 % — LOW (ref 13–44)
MCHC RBC-ENTMCNC: 23.9 PG — LOW (ref 27–34)
MCHC RBC-ENTMCNC: 30.2 GM/DL — LOW (ref 32–36)
MCV RBC AUTO: 79.1 FL — LOW (ref 80–100)
MONOCYTES # BLD AUTO: 0.88 K/UL — SIGNIFICANT CHANGE UP (ref 0–0.9)
MONOCYTES NFR BLD AUTO: 9.7 % — SIGNIFICANT CHANGE UP (ref 2–14)
NEUTROPHILS # BLD AUTO: 6.87 K/UL — SIGNIFICANT CHANGE UP (ref 1.8–7.4)
NEUTROPHILS NFR BLD AUTO: 75.8 % — SIGNIFICANT CHANGE UP (ref 43–77)
NRBC # BLD: 0 /100 WBCS — SIGNIFICANT CHANGE UP (ref 0–0)
PLATELET # BLD AUTO: 306 K/UL — SIGNIFICANT CHANGE UP (ref 150–400)
POTASSIUM SERPL-MCNC: 3.8 MMOL/L — SIGNIFICANT CHANGE UP (ref 3.5–5.3)
POTASSIUM SERPL-SCNC: 3.8 MMOL/L — SIGNIFICANT CHANGE UP (ref 3.5–5.3)
PROT SERPL-MCNC: 5.9 G/DL — LOW (ref 6–8.3)
RBC # BLD: 3.35 M/UL — LOW (ref 4.2–5.8)
RBC # FLD: 17.1 % — HIGH (ref 10.3–14.5)
SODIUM SERPL-SCNC: 141 MMOL/L — SIGNIFICANT CHANGE UP (ref 135–145)
WBC # BLD: 9.07 K/UL — SIGNIFICANT CHANGE UP (ref 3.8–10.5)
WBC # FLD AUTO: 9.07 K/UL — SIGNIFICANT CHANGE UP (ref 3.8–10.5)

## 2020-05-11 PROCEDURE — 93005 ELECTROCARDIOGRAM TRACING: CPT

## 2020-05-11 PROCEDURE — 97163 PT EVAL HIGH COMPLEX 45 MIN: CPT

## 2020-05-11 PROCEDURE — 85610 PROTHROMBIN TIME: CPT

## 2020-05-11 PROCEDURE — 85379 FIBRIN DEGRADATION QUANT: CPT

## 2020-05-11 PROCEDURE — 73700 CT LOWER EXTREMITY W/O DYE: CPT

## 2020-05-11 PROCEDURE — 86900 BLOOD TYPING SEROLOGIC ABO: CPT

## 2020-05-11 PROCEDURE — 73590 X-RAY EXAM OF LOWER LEG: CPT

## 2020-05-11 PROCEDURE — 86850 RBC ANTIBODY SCREEN: CPT

## 2020-05-11 PROCEDURE — 85014 HEMATOCRIT: CPT

## 2020-05-11 PROCEDURE — 72192 CT PELVIS W/O DYE: CPT

## 2020-05-11 PROCEDURE — 80048 BASIC METABOLIC PNL TOTAL CA: CPT

## 2020-05-11 PROCEDURE — 97166 OT EVAL MOD COMPLEX 45 MIN: CPT

## 2020-05-11 PROCEDURE — 73060 X-RAY EXAM OF HUMERUS: CPT

## 2020-05-11 PROCEDURE — 70450 CT HEAD/BRAIN W/O DYE: CPT

## 2020-05-11 PROCEDURE — 86901 BLOOD TYPING SEROLOGIC RH(D): CPT

## 2020-05-11 PROCEDURE — C1713: CPT

## 2020-05-11 PROCEDURE — 99285 EMERGENCY DEPT VISIT HI MDM: CPT | Mod: 25

## 2020-05-11 PROCEDURE — 86140 C-REACTIVE PROTEIN: CPT

## 2020-05-11 PROCEDURE — 83036 HEMOGLOBIN GLYCOSYLATED A1C: CPT

## 2020-05-11 PROCEDURE — 85027 COMPLETE CBC AUTOMATED: CPT

## 2020-05-11 PROCEDURE — 86923 COMPATIBILITY TEST ELECTRIC: CPT

## 2020-05-11 PROCEDURE — 85730 THROMBOPLASTIN TIME PARTIAL: CPT

## 2020-05-11 PROCEDURE — 87635 SARS-COV-2 COVID-19 AMP PRB: CPT

## 2020-05-11 PROCEDURE — 82728 ASSAY OF FERRITIN: CPT

## 2020-05-11 PROCEDURE — 85018 HEMOGLOBIN: CPT

## 2020-05-11 PROCEDURE — 73502 X-RAY EXAM HIP UNI 2-3 VIEWS: CPT

## 2020-05-11 PROCEDURE — 82962 GLUCOSE BLOOD TEST: CPT

## 2020-05-11 PROCEDURE — 71045 X-RAY EXAM CHEST 1 VIEW: CPT

## 2020-05-11 PROCEDURE — 73090 X-RAY EXAM OF FOREARM: CPT

## 2020-05-11 PROCEDURE — 73552 X-RAY EXAM OF FEMUR 2/>: CPT

## 2020-05-11 PROCEDURE — 36415 COLL VENOUS BLD VENIPUNCTURE: CPT

## 2020-05-11 PROCEDURE — 99239 HOSP IP/OBS DSCHRG MGMT >30: CPT

## 2020-05-11 PROCEDURE — 76000 FLUOROSCOPY <1 HR PHYS/QHP: CPT

## 2020-05-11 PROCEDURE — 80053 COMPREHEN METABOLIC PANEL: CPT

## 2020-05-11 PROCEDURE — 73562 X-RAY EXAM OF KNEE 3: CPT

## 2020-05-11 RX ORDER — SENNA PLUS 8.6 MG/1
2 TABLET ORAL
Qty: 0 | Refills: 0 | DISCHARGE
Start: 2020-05-11

## 2020-05-11 RX ADMIN — ENOXAPARIN SODIUM 40 MILLIGRAM(S): 100 INJECTION SUBCUTANEOUS at 05:24

## 2020-05-11 RX ADMIN — OXYCODONE HYDROCHLORIDE 10 MILLIGRAM(S): 5 TABLET ORAL at 08:10

## 2020-05-11 RX ADMIN — Medication 0.25 MILLIGRAM(S): at 01:51

## 2020-05-11 RX ADMIN — OXYCODONE HYDROCHLORIDE 10 MILLIGRAM(S): 5 TABLET ORAL at 12:46

## 2020-05-11 RX ADMIN — PANTOPRAZOLE SODIUM 40 MILLIGRAM(S): 20 TABLET, DELAYED RELEASE ORAL at 08:15

## 2020-05-11 RX ADMIN — Medication 1: at 08:05

## 2020-05-11 RX ADMIN — Medication 500 MILLIGRAM(S): at 08:04

## 2020-05-11 NOTE — DISCHARGE NOTE NURSING/CASE MANAGEMENT/SOCIAL WORK - NSDCPEPTSTRK_GEN_ALL_CORE
Call 911 for stroke/Need for follow up after discharge/Prescribed medications/Stroke education booklet/Stroke warning signs and symptoms/Risk factors for stroke/Stroke support groups for patients, families, and friends/Signs and symptoms of stroke

## 2020-05-11 NOTE — PROGRESS NOTE ADULT - PROBLEM SELECTOR PLAN 4
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 Mauri White at [place]  - Pt on home hospice  - oxygen supplementation prn  - patient medically stable for d/c, awaiting reinstatement of home hospice, likely d/c today

## 2020-05-11 NOTE — PROGRESS NOTE ADULT - PROBLEM SELECTOR PROBLEM 1
Lung cancer
Pubic ramus fracture, left, closed, initial encounter

## 2020-05-11 NOTE — PROGRESS NOTE ADULT - REASON FOR ADMISSION
Destructive lytic lesion of left superior pubic ramus

## 2020-05-11 NOTE — PROGRESS NOTE ADULT - PROBLEM SELECTOR PLAN 1
-pubic ramus pathological fx in setting of lung ca with metastasis to bone  - s/p ORIF L Distal Femur Pathological Fracture POD #4  -H/H stable  -lovenox for dvt ppx for 1 month as per ortho   -pain management  -PT: home with assist -pubic ramus pathological fx in setting of lung ca with metastasis to bone  - s/p ORIF L Distal Femur Pathological Fracture POD #4  -H/H cont to trend, appears to have had a mild drop  -lovenox for dvt ppx for 1 month as per ortho   -pain management  -PT: home with assist -pubic ramus pathological fx in setting of lung ca with metastasis to bone  - s/p ORIF L Distal Femur Pathological Fracture POD #4  -H/H cont to trend, appears to have had a mild drop  -lovenox for dvt ppx for 1 month as per ortho   -pain management  -PT: home with assist, plan for home hospice

## 2020-05-11 NOTE — DISCHARGE NOTE NURSING/CASE MANAGEMENT/SOCIAL WORK - PATIENT PORTAL LINK FT
You can access the FollowMyHealth Patient Portal offered by [facility] by registering at the following website: [email or web address]. By joining [facility]’s FollowMyHealth portal, you will also be able to view your health information using other applications (apps) compatible with our system.

## 2020-05-11 NOTE — PROGRESS NOTE ADULT - PROBLEM SELECTOR PLAN 2
-though COVID PCR negative, CXR on admission shows b/l interstitial and airspace infiltrates suggestive of atypical pneumonia/viral pneumonia including COVID 19  -treat as presumed covid   -patient will need home oxygen 2L via NC, to be provided by home hospice  -Pulm (Dr Davila) recs appreciated

## 2020-05-11 NOTE — PROGRESS NOTE ADULT - SUBJECTIVE AND OBJECTIVE BOX
Date/Time Patient Seen:  		  Referring MD:   Data Reviewed	       Patient is a 85y old  Male who presents with a chief complaint of Destructive lytic lesion of left superior pubic ramus (11 May 2020 07:46)      Subjective/HPI     PAST MEDICAL & SURGICAL HISTORY:  BPH (benign prostatic hyperplasia)  TIA (transient ischemic attack)  Bone cancer due to spread from other site  Lung cancer  Dementia  Diabetes: not on insulin  H/O total knee replacement  History of cholecystectomy        Medication list         MEDICATIONS  (STANDING):  ascorbic acid 500 milliGRAM(s) Oral two times a day  dextrose 5%. 1000 milliLiter(s) (50 mL/Hr) IV Continuous <Continuous>  dextrose 50% Injectable 12.5 Gram(s) IV Push once  dextrose 50% Injectable 25 Gram(s) IV Push once  dextrose 50% Injectable 25 Gram(s) IV Push once  enoxaparin Injectable 40 milliGRAM(s) SubCutaneous every 24 hours  finasteride 5 milliGRAM(s) Oral daily  folic acid 1 milliGRAM(s) Oral daily  insulin lispro (HumaLOG) corrective regimen sliding scale   SubCutaneous three times a day before meals  insulin lispro (HumaLOG) corrective regimen sliding scale   SubCutaneous at bedtime  lactated ringers. 1000 milliLiter(s) (50 mL/Hr) IV Continuous <Continuous>  multivitamin 1 Tablet(s) Oral daily  pantoprazole    Tablet 40 milliGRAM(s) Oral before breakfast  polyethylene glycol 3350 17 Gram(s) Oral daily  tamsulosin 0.4 milliGRAM(s) Oral at bedtime    MEDICATIONS  (PRN):  acetaminophen   Tablet .. 650 milliGRAM(s) Oral every 6 hours PRN Temp greater or equal to 38C (100.4F), Mild Pain (1 - 3)  aluminum hydroxide/magnesium hydroxide/simethicone Suspension 30 milliLiter(s) Oral four times a day PRN Indigestion  bisacodyl Suppository 10 milliGRAM(s) Rectal daily PRN If no bowel movement by postoperative day #2  dextrose 40% Gel 15 Gram(s) Oral once PRN Blood Glucose LESS THAN 70 milliGRAM(s)/deciliter  glucagon  Injectable 1 milliGRAM(s) IntraMuscular once PRN Glucose LESS THAN 70 milligrams/deciliter  LORazepam     Tablet 0.25 milliGRAM(s) Oral every 6 hours PRN Agitation  ondansetron Injectable 4 milliGRAM(s) IV Push every 6 hours PRN Nausea and/or Vomiting  oxyCODONE    IR 5 milliGRAM(s) Oral every 4 hours PRN Moderate Pain (4 - 6)  oxyCODONE    IR 10 milliGRAM(s) Oral every 4 hours PRN Severe Pain (7 - 10)  senna 2 Tablet(s) Oral at bedtime PRN Constipation         Vitals log        ICU Vital Signs Last 24 Hrs  T(C): 36.4 (11 May 2020 07:59), Max: 36.6 (10 May 2020 13:47)  T(F): 97.6 (11 May 2020 07:59), Max: 97.9 (10 May 2020 13:47)  HR: 79 (11 May 2020 07:59) (68 - 86)  BP: 113/62 (11 May 2020 07:59) (113/62 - 149/67)  BP(mean): --  ABP: --  ABP(mean): --  RR: 19 (11 May 2020 07:59) (18 - 21)  SpO2: 97% (11 May 2020 05:11) (89% - 99%)           Input and Output:  I&O's Detail    10 May 2020 07:01  -  11 May 2020 07:00  --------------------------------------------------------  IN:    Oral Fluid: 400 mL  Total IN: 400 mL    OUT:    Voided: 525 mL  Total OUT: 525 mL    Total NET: -125 mL          Lab Data                        8.0    9.07  )-----------( 306      ( 11 May 2020 07:37 )             26.5     05-11    141  |  103  |  12  ----------------------------<  141<H>  3.8   |  32<H>  |  0.49<L>    Ca    9.2      11 May 2020 07:37    TPro  5.9<L>  /  Alb  2.4<L>  /  TBili  0.5  /  DBili  x   /  AST  22  /  ALT  13  /  AlkPhos  181<H>  05-11            Review of Systems	      Objective     Physical Examination    heart s1s2  lung dec BS  abd soft      Pertinent Lab findings & Imaging      Almendarez:  NO   Adequate UO     I&O's Detail    10 May 2020 07:01  -  11 May 2020 07:00  --------------------------------------------------------  IN:    Oral Fluid: 400 mL  Total IN: 400 mL    OUT:    Voided: 525 mL  Total OUT: 525 mL    Total NET: -125 mL               Discussed with:     Cultures:	        Radiology

## 2020-05-11 NOTE — PROGRESS NOTE ADULT - ASSESSMENT
86 y/o male with PMHx Lung cancer with mets to bones, BPH, Dementia, Non-insulin dependent T2DM, Lung cancer, TIA BIBA from home due to fall out of bed. Found to have destructive lytic lesion involving the left superior pubic ramus, likely metastatic pathologic fracture; s/p ORIF L Distal Femur PPx Pathologic Fracture 5/7/20. Medically stable for d/c pending reinstatement of home hospice.

## 2020-05-11 NOTE — PROGRESS NOTE ADULT - PROBLEM SELECTOR PLAN 7
Chronic, stable  - reorient as needed, ativan PRN for agitation, on 1:1  - Caution with BEERs medications  - poor PO intake, d/w daughter takes regular diabetic diet at home with kasandra bailey- d/w speech/swallow not candidate for eval as he is home hospice patient

## 2020-05-11 NOTE — PROGRESS NOTE ADULT - PROBLEM SELECTOR PLAN 10
Pt on home hospice  - discussion with daughter, Pati Cuba [phone] who is HCP  - Pt is DNR/DNI. Goal is for patient to be comfortable. MOLST completed and placed in chart.

## 2020-05-11 NOTE — PROGRESS NOTE ADULT - SUBJECTIVE AND OBJECTIVE BOX
Patient seen and examined at bedside. No acute events over night. Per RN, patient agitated requiring ativan and 1:1. Patient minimally conversational on exam. Denies any acute complaints.     PE:  Vital Signs Last 24 Hrs  T(C): 36.3 (11 May 2020 05:11), Max: 36.6 (10 May 2020 13:47)  T(F): 97.4 (11 May 2020 05:11), Max: 97.9 (10 May 2020 13:47)  HR: 78 (11 May 2020 05:11) (68 - 86)  BP: 130/62 (11 May 2020 05:11) (116/58 - 149/67)  BP(mean): --  RR: 18 (11 May 2020 05:11) (18 - 21)  SpO2: 97% (11 May 2020 05:11) (89% - 99%)    General: NAD, resting comfortably in bed  L LE:   Dressing C/D/I in bulky pérez KI,. at the bottom of leg. Pulled back up centered over knee  SCDs present contralaterally  Compartments soft and compressible  No calf tenderness bilaterally  motor response to noxious stimuli  Unable to assess sensation 2/2 mental status  2+ DP/PT                                             8.0    9.07  )-----------( 306      ( 11 May 2020 07:37 )             26.5   05-10    140  |  104  |  12  ----------------------------<  119<H>  3.5   |  31  |  0.44<L>    Ca    8.7      10 May 2020 10:34    TPro  6.0  /  Alb  2.2<L>  /  TBili  0.5  /  DBili  x   /  AST  25  /  ALT  13  /  AlkPhos  178<H>  05-10        A/P:  85y m s/p Distal femur ORIF POD4  -PT/OT - NWB on the LLE  -Pain Control  -DVT ppx w/lovenox  -FU AM Labs, H&H satble  -Rest, ice, compress and elevate the extremity as we needed  -Incentive Spirometry  -Medical management appreciated  -Dispo planing: Pt with presumed COVId + on home hospice, recommending d/c once medically stable and can be safely discharged home on home hospice  -Ortho stable for DC. home hospice today

## 2020-05-11 NOTE — PROGRESS NOTE ADULT - PROBLEM SELECTOR PLAN 1
85y Male POD 3 from ORIF L Distal Femur PPx Pathologic Fracture  enc PO intake  ID eval noted  PT and OT eval noted -   DM care  mets ca - Lung Ca - neg covid on PCR  on o2 support - keep sat > 88 pct - use I yaima if able to cooperate  VS and HD and Sat -   pain rx regimen - Opioids on order - Bowel regimen on order -   supportive medical regimen  pt is DNR DNI  prognosis poor - pt was on Home Hospice -   dc plan will need to include consideration for PT and Hospice and Mets Lung Ca.

## 2020-05-11 NOTE — PROGRESS NOTE ADULT - SUBJECTIVE AND OBJECTIVE BOX
Patient is a 85y old  Male who presents with a chief complaint of Destructive lytic lesion of left superior pubic ramus (10 May 2020 09:59)      FROM ADMISSION H+P:   HPI:  84 y/o male with PMHx Lung cancer with mets to bones (dx 8/2019) on home hospice, BPH, Dementia, Non-insulin dependent T2DM, hx TIA BIBA from home due to fall out of bed. Patient unable to give history due to dementia. History obtained from Pati [place] ([phone]). Pt fell on 5/1/20 out of bed and fell again on 5/4. States that home hospice was unable to have patient receive outpatient XR and was advised to go to hospital. Patient occasionally ambulates and has been bedridden for the last week. Patient has been taking Percocet and Ativan ATC for pain but due to dementia, daughter unable to gauge his pain level. States that patient had a dry cough about a month ago and occasionally takes albuterol inhaler when he appears sob. Denies fevers/chills, n/v, diarrhea, abdominal pain, recent travel or COVID exposure. Of note, patient has not had any treatment for metastatic lung cancer due to rapid progression and onset of dementia.    In the ED, patient's vitals were: /71, HR 77, RR 16 and SpO2 95% on room air. Significant labs include: wbc 10.51, H/H 10.3/33.1, MCV 76.4, Na 133.   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 No midline shift or hydrocephalus. Mildly motion limited study.  L XR hip/pelvis: Destructive lytic lesion involving the left superior pubic ramus. Sclerosis involving the left inferior pubic ramus. The findings are likely metastatic. Cannot rule out underlying pathologic fracture. Consider CT scan for additional diagnostic benefit.  Comminuted displaced angulated distal left femoral shaft fracture just above the femoral component of a bipolar knee prosthesis. The fracture may be pathologic.   CXR: Bilateral interstitial and airspace infiltrates suggestive of atypical pneumonia/viral pneumonia including COVID 19.   CT LLE and pelvis pending results  EKG: SR with APCs, HR 85 (05 May 2020 21:27)      ----  INTERVAL HPI/OVERNIGHT EVENTS: Pt seen and evaluated at the bedside. No acute vents overnight. O2 sat 97% on 2L NC.      Fever or chills: yes [  ]   no [  ]  Fatigue, malaise or generalized weakness: yes [  ]   no [  ]  Chest pain: yes [  ]   no [  ]  Palpitations: yes [  ]   no [  ]  Shortness of breath/dyspnea: yes [  ]   no [  ]  Cough: yes [  ]   no [  ], sputum production: yes [  ]   no [  ]  Anorexia/po intolerance: yes [  ]   no [  ]  Myalgias: yes [  ]   no [  ]  Headache: yes [  ]   no [  ]  Sore throat: yes [  ]   no [  ]  Rhinorrhea: yes [  ]   no [  ]  Nausea: yes [  ]   no [  ]  Vomiting: yes [  ]   no [  ]  Diarrhea: yes [  ]   no [  ]  Loss of sense of smell/anosmia: yes [  ]   no [  ]  Conjunctivitis: yes [  ]   no [  ]  Other associated complaints: unable to obtain due to dementia and [place]     ----  PAST MEDICAL & SURGICAL HISTORY:  BPH (benign prostatic hyperplasia)  TIA (transient ischemic attack)  Bone cancer due to spread from other site  Lung cancer  Dementia  Diabetes: not on insulin  H/O total knee replacement  History of cholecystectomy      FAMILY HISTORY:  No pertinent family history in first degree relatives      Allergies    No Known Allergies    Intolerances        ----  REVIEW OF SYSTEMS:  as per HPI    ----  PHYSICAL EXAM:  GENERAL: patient appears well, no acute distress, elderly, nontoxic  EYES: sclera clear without erythema, no exudates  ENMT: oropharynx clear without erythema, moist mucous membranes  LUNGS: decreased breath sounds b/l, no wheezing or rhonchi appreciated  HEART: soft S1/S2, regular rate and rhythm, no murmurs noted, no noted edema to b/l LE  GASTROINTESTINAL: abdomen is soft, nontender, nondistended, normoactive bowel sounds, no palpable masses  INTEGUMENT: good skin turgor, appropriate for ethnicity, appears well perfused, no jaundice noted  MUSCULOSKELETAL: no clubbing or cyanosis, no obvious deformity, (+) left leg in immobilizer, left hip dressing C/D/I, underlying ecchymoses noted   NEUROLOGIC: awake, alert, oriented x1, no obvious focal neurological deficits     ICU Vital Signs Last 24 Hrs  T(C): 36.3 (11 May 2020 05:11), Max: 36.6 (10 May 2020 13:47)  T(F): 97.4 (11 May 2020 05:11), Max: 97.9 (10 May 2020 13:47)  HR: 78 (11 May 2020 05:11) (68 - 86)  BP: 130/62 (11 May 2020 05:11) (116/58 - 149/67)  BP(mean): --  ABP: --  ABP(mean): --  RR: 18 (11 May 2020 05:11) (18 - 21)  SpO2: 97% (11 May 2020 05:11) (89% - 99%)    05-10    140  |  104  |  12  ----------------------------<  119<H>  3.5   |  31  |  0.44<L>    Ca    8.7      10 May 2020 10:34    TPro  6.0  /  Alb  2.2<L>  /  TBili  0.5  /  DBili  x   /  AST  25  /  ALT  13  /  AlkPhos  178<H>  05-10      ----  Personally reviewed:  Vital sign trends: [  x] yes    [  ] no     [  ] n/a  Laboratory results: [ x ] yes    [  ] no     [  ] n/a  Radiology results: [x  ] yes    [  ] no     [  ] n/a  Culture results: [ x ] yes    [  ] no     [  ] n/a  Consultant recommendations: [ x ] yes    [  ] no     [  ] n/a This progress note was completed in part by resident acting as telephonic scribe during COVID-19 crisis. Physical exam and review of systems was completed by attending physician, and findings below are those of the attending physician, and have been reviewed in detail.     Patient is a 85y old  Male who presents with a chief complaint of Destructive lytic lesion of left superior pubic ramus (11 May 2020 07:46)      INTERVAL HPI/OVERNIGHT EVENTS:  Pt seen and examined at bedside. No acute events overnight.     Pain Location & Control:     MEDICATIONS  (STANDING):  ascorbic acid 500 milliGRAM(s) Oral two times a day  dextrose 5%. 1000 milliLiter(s) (50 mL/Hr) IV Continuous <Continuous>  dextrose 50% Injectable 12.5 Gram(s) IV Push once  dextrose 50% Injectable 25 Gram(s) IV Push once  dextrose 50% Injectable 25 Gram(s) IV Push once  enoxaparin Injectable 40 milliGRAM(s) SubCutaneous every 24 hours  finasteride 5 milliGRAM(s) Oral daily  folic acid 1 milliGRAM(s) Oral daily  insulin lispro (HumaLOG) corrective regimen sliding scale   SubCutaneous three times a day before meals  insulin lispro (HumaLOG) corrective regimen sliding scale   SubCutaneous at bedtime  lactated ringers. 1000 milliLiter(s) (50 mL/Hr) IV Continuous <Continuous>  multivitamin 1 Tablet(s) Oral daily  pantoprazole    Tablet 40 milliGRAM(s) Oral before breakfast  polyethylene glycol 3350 17 Gram(s) Oral daily  tamsulosin 0.4 milliGRAM(s) Oral at bedtime    MEDICATIONS  (PRN):  acetaminophen   Tablet .. 650 milliGRAM(s) Oral every 6 hours PRN Temp greater or equal to 38C (100.4F), Mild Pain (1 - 3)  aluminum hydroxide/magnesium hydroxide/simethicone Suspension 30 milliLiter(s) Oral four times a day PRN Indigestion  bisacodyl Suppository 10 milliGRAM(s) Rectal daily PRN If no bowel movement by postoperative day #2  dextrose 40% Gel 15 Gram(s) Oral once PRN Blood Glucose LESS THAN 70 milliGRAM(s)/deciliter  glucagon  Injectable 1 milliGRAM(s) IntraMuscular once PRN Glucose LESS THAN 70 milligrams/deciliter  LORazepam     Tablet 0.25 milliGRAM(s) Oral every 6 hours PRN Agitation  ondansetron Injectable 4 milliGRAM(s) IV Push every 6 hours PRN Nausea and/or Vomiting  oxyCODONE    IR 5 milliGRAM(s) Oral every 4 hours PRN Moderate Pain (4 - 6)  oxyCODONE    IR 10 milliGRAM(s) Oral every 4 hours PRN Severe Pain (7 - 10)  senna 2 Tablet(s) Oral at bedtime PRN Constipation      Allergies    No Known Allergies    Intolerances        REVIEW OF SYSTEMS:  CONSTITUTIONAL: No fever, weight loss, or fatigue  EYES: No eye pain, visual disturbances, or discharge  ENMT:  No difficulty hearing, tinnitus, vertigo; No sinus or throat pain  NECK: No pain or stiffness  BREASTS: No pain, masses, or nipple discharge  RESPIRATORY: No cough, wheezing, chills or hemoptysis; No shortness of breath  CARDIOVASCULAR: No chest pain, palpitations, dizziness, or leg swelling  GASTROINTESTINAL: No abdominal or epigastric pain. No nausea, vomiting, or hematemesis; No diarrhea or constipation. No melena or hematochezia.  GENITOURINARY: No dysuria, frequency, hematuria, or incontinence  NEUROLOGICAL: No headaches, memory loss, loss of strength, numbness, or tremors  SKIN: No itching, burning, rashes, or lesions   LYMPH NODES: No enlarged glands  ENDOCRINE: No heat or cold intolerance; No hair loss; No polydipsia or polyuria  MUSCULOSKELETAL: No back pain  PSYCHIATRIC: No depression, anxiety, mood swings, or difficulty sleeping  HEME/LYMPH: No easy bruising, or bleeding gums  ALLERGY AND IMMUNOLOGIC: No hives or eczema    Vital Signs Last 24 Hrs  T(C): 36.4 (11 May 2020 07:59), Max: 36.6 (10 May 2020 13:47)  T(F): 97.6 (11 May 2020 07:59), Max: 97.9 (10 May 2020 13:47)  HR: 79 (11 May 2020 07:59) (68 - 86)  BP: 113/62 (11 May 2020 07:59) (113/62 - 149/67)  BP(mean): --  RR: 19 (11 May 2020 07:59) (18 - 21)  SpO2: 97% (11 May 2020 05:11) (89% - 99%)    PHYSICAL EXAM:  GENERAL: NAD, well-groomed, well-developed  HEAD:  Atraumatic, Normocephalic  EYES: EOMI, PERRLA, conjunctiva and sclera clear  ENMT: No tonsillar erythema, exudates, or enlargement; Moist mucous membranes, Good dentition, No lesions  NECK: Supple, No JVD, Normal thyroid  NERVOUS SYSTEM:  Alert & Oriented X3, Good concentration; Motor Strength 5/5 B/L upper and lower extremities; DTRs 2+ intact and symmetric  CHEST/LUNG: Clear to auscultation bilaterally; No rales, rhonchi, wheezing, or rubs  HEART: Regular rate and rhythm; No murmurs, rubs, or gallops  ABDOMEN: Soft, Nontender, Nondistended; Bowel sounds present  EXTREMITIES:  2+ Peripheral Pulses, No clubbing or cyanosis  LYMPH: No lymphadenopathy noted  SKIN: No rashes or lesions  INCISION:  Dressing dry and intact    LABS:                        8.0    9.07  )-----------( 306      ( 11 May 2020 07:37 )             26.5     11 May 2020 07:37    141    |  103    |  12     ----------------------------<  141    3.8     |  32     |  0.49     Ca    9.2        11 May 2020 07:37    TPro  5.9    /  Alb  2.4    /  TBili  0.5    /  DBili  x      /  AST  22     /  ALT  13     /  AlkPhos  181    11 May 2020 07:37        CAPILLARY BLOOD GLUCOSE      POCT Blood Glucose.: 152 mg/dL (11 May 2020 07:42)  POCT Blood Glucose.: 148 mg/dL (10 May 2020 20:55)  POCT Blood Glucose.: 150 mg/dL (10 May 2020 16:42)  POCT Blood Glucose.: 168 mg/dL (10 May 2020 11:31)        Cultures      RADIOLOGY & ADDITIONAL TESTS:    Imaging Personally Reviewed:  [x ] YES  [ ] NO    Consultant(s) Notes Reviewed:  [x ] YES  [ ] NO    Care Discussed with Consultants/Other Providers [x ] YES  [ ] NO This progress note was completed in part by resident acting as telephonic scribe during COVID-19 crisis. Physical exam and review of systems was completed by attending physician, and findings below are those of the attending physician, and have been reviewed in detail.     Patient is a 85y old  Male who presents with a chief complaint of Destructive lytic lesion of left superior pubic ramus (11 May 2020 07:46)      INTERVAL HPI/OVERNIGHT EVENTS:  Pt seen and examined at bedside. No acute events overnight.     Pain Location & Control: controlled     MEDICATIONS  (STANDING):  ascorbic acid 500 milliGRAM(s) Oral two times a day  dextrose 5%. 1000 milliLiter(s) (50 mL/Hr) IV Continuous <Continuous>  dextrose 50% Injectable 12.5 Gram(s) IV Push once  dextrose 50% Injectable 25 Gram(s) IV Push once  dextrose 50% Injectable 25 Gram(s) IV Push once  enoxaparin Injectable 40 milliGRAM(s) SubCutaneous every 24 hours  finasteride 5 milliGRAM(s) Oral daily  folic acid 1 milliGRAM(s) Oral daily  insulin lispro (HumaLOG) corrective regimen sliding scale   SubCutaneous three times a day before meals  insulin lispro (HumaLOG) corrective regimen sliding scale   SubCutaneous at bedtime  lactated ringers. 1000 milliLiter(s) (50 mL/Hr) IV Continuous <Continuous>  multivitamin 1 Tablet(s) Oral daily  pantoprazole    Tablet 40 milliGRAM(s) Oral before breakfast  polyethylene glycol 3350 17 Gram(s) Oral daily  tamsulosin 0.4 milliGRAM(s) Oral at bedtime    MEDICATIONS  (PRN):  acetaminophen   Tablet .. 650 milliGRAM(s) Oral every 6 hours PRN Temp greater or equal to 38C (100.4F), Mild Pain (1 - 3)  aluminum hydroxide/magnesium hydroxide/simethicone Suspension 30 milliLiter(s) Oral four times a day PRN Indigestion  bisacodyl Suppository 10 milliGRAM(s) Rectal daily PRN If no bowel movement by postoperative day #2  dextrose 40% Gel 15 Gram(s) Oral once PRN Blood Glucose LESS THAN 70 milliGRAM(s)/deciliter  glucagon  Injectable 1 milliGRAM(s) IntraMuscular once PRN Glucose LESS THAN 70 milligrams/deciliter  LORazepam     Tablet 0.25 milliGRAM(s) Oral every 6 hours PRN Agitation  ondansetron Injectable 4 milliGRAM(s) IV Push every 6 hours PRN Nausea and/or Vomiting  oxyCODONE    IR 5 milliGRAM(s) Oral every 4 hours PRN Moderate Pain (4 - 6)  oxyCODONE    IR 10 milliGRAM(s) Oral every 4 hours PRN Severe Pain (7 - 10)  senna 2 Tablet(s) Oral at bedtime PRN Constipation      Allergies    No Known Allergies    Intolerances        REVIEW OF SYSTEMS:  CONSTITUTIONAL: No fever, weight loss, or fatigue  EYES: No eye pain, visual disturbances, or discharge  ENMT:  No difficulty hearing, tinnitus, vertigo; No sinus or throat pain  NECK: No pain or stiffness  BREASTS: No pain, masses, or nipple discharge  RESPIRATORY: No cough, wheezing, chills or hemoptysis; No shortness of breath  CARDIOVASCULAR: No chest pain, palpitations, dizziness, or leg swelling  GASTROINTESTINAL: No abdominal or epigastric pain. No nausea, vomiting, or hematemesis; No diarrhea or constipation. No melena or hematochezia.  GENITOURINARY: No dysuria, frequency, hematuria, or incontinence  NEUROLOGICAL: No headaches, memory loss, loss of strength, numbness, or tremors  SKIN: No itching, burning, rashes, or lesions   LYMPH NODES: No enlarged glands  ENDOCRINE: No heat or cold intolerance; No hair loss; No polydipsia or polyuria  MUSCULOSKELETAL: No back pain  PSYCHIATRIC: No depression, anxiety, mood swings, or difficulty sleeping  HEME/LYMPH: No easy bruising, or bleeding gums  ALLERGY AND IMMUNOLOGIC: No hives or eczema    Vital Signs Last 24 Hrs  T(C): 36.4 (11 May 2020 07:59), Max: 36.6 (10 May 2020 13:47)  T(F): 97.6 (11 May 2020 07:59), Max: 97.9 (10 May 2020 13:47)  HR: 79 (11 May 2020 07:59) (68 - 86)  BP: 113/62 (11 May 2020 07:59) (113/62 - 149/67)  BP(mean): --  RR: 19 (11 May 2020 07:59) (18 - 21)  SpO2: 97% (11 May 2020 05:11) (89% - 99%)    PHYSICAL EXAM:  GENERAL: NAD, well-groomed, well-developed  HEAD:  Atraumatic, Normocephalic  EYES: EOMI, PERRLA, conjunctiva and sclera clear  ENMT: No tonsillar erythema, exudates, or enlargement; Moist mucous membranes, Good dentition, No lesions  NECK: Supple, No JVD, Normal thyroid  NERVOUS SYSTEM:  Alert & Oriented X3, Good concentration; Motor Strength 5/5 B/L upper and lower extremities; DTRs 2+ intact and symmetric  CHEST/LUNG: Clear to auscultation bilaterally; No rales, rhonchi, wheezing, or rubs  HEART: Regular rate and rhythm; No murmurs, rubs, or gallops  ABDOMEN: Soft, Nontender, Nondistended; Bowel sounds present  EXTREMITIES:  2+ Peripheral Pulses, No clubbing or cyanosis  LYMPH: No lymphadenopathy noted  SKIN: No rashes or lesions  INCISION:  Dressing dry and intact    LABS:                        8.0    9.07  )-----------( 306      ( 11 May 2020 07:37 )             26.5     11 May 2020 07:37    141    |  103    |  12     ----------------------------<  141    3.8     |  32     |  0.49     Ca    9.2        11 May 2020 07:37    TPro  5.9    /  Alb  2.4    /  TBili  0.5    /  DBili  x      /  AST  22     /  ALT  13     /  AlkPhos  181    11 May 2020 07:37        CAPILLARY BLOOD GLUCOSE      POCT Blood Glucose.: 152 mg/dL (11 May 2020 07:42)  POCT Blood Glucose.: 148 mg/dL (10 May 2020 20:55)  POCT Blood Glucose.: 150 mg/dL (10 May 2020 16:42)  POCT Blood Glucose.: 168 mg/dL (10 May 2020 11:31)        Cultures      RADIOLOGY & ADDITIONAL TESTS:    Imaging Personally Reviewed:  [x ] YES  [ ] NO    Consultant(s) Notes Reviewed:  [x ] YES  [ ] NO    Care Discussed with Consultants/Other Providers [x ] YES  [ ] NO This progress note was completed in part by resident acting as telephonic scribe during COVID-19 crisis. Physical exam and review of systems was completed by attending physician, and findings below are those of the attending physician, and have been reviewed in detail.     Patient is a 85y old  Male who presents with a chief complaint of Destructive lytic lesion of left superior pubic ramus (11 May 2020 07:46)      INTERVAL HPI/OVERNIGHT EVENTS:  Pt seen and examined at bedside. No acute events overnight.     Pain Location & Control: controlled     MEDICATIONS  (STANDING):  ascorbic acid 500 milliGRAM(s) Oral two times a day  dextrose 5%. 1000 milliLiter(s) (50 mL/Hr) IV Continuous <Continuous>  dextrose 50% Injectable 12.5 Gram(s) IV Push once  dextrose 50% Injectable 25 Gram(s) IV Push once  dextrose 50% Injectable 25 Gram(s) IV Push once  enoxaparin Injectable 40 milliGRAM(s) SubCutaneous every 24 hours  finasteride 5 milliGRAM(s) Oral daily  folic acid 1 milliGRAM(s) Oral daily  insulin lispro (HumaLOG) corrective regimen sliding scale   SubCutaneous three times a day before meals  insulin lispro (HumaLOG) corrective regimen sliding scale   SubCutaneous at bedtime  lactated ringers. 1000 milliLiter(s) (50 mL/Hr) IV Continuous <Continuous>  multivitamin 1 Tablet(s) Oral daily  pantoprazole    Tablet 40 milliGRAM(s) Oral before breakfast  polyethylene glycol 3350 17 Gram(s) Oral daily  tamsulosin 0.4 milliGRAM(s) Oral at bedtime    MEDICATIONS  (PRN):  acetaminophen   Tablet .. 650 milliGRAM(s) Oral every 6 hours PRN Temp greater or equal to 38C (100.4F), Mild Pain (1 - 3)  aluminum hydroxide/magnesium hydroxide/simethicone Suspension 30 milliLiter(s) Oral four times a day PRN Indigestion  bisacodyl Suppository 10 milliGRAM(s) Rectal daily PRN If no bowel movement by postoperative day #2  dextrose 40% Gel 15 Gram(s) Oral once PRN Blood Glucose LESS THAN 70 milliGRAM(s)/deciliter  glucagon  Injectable 1 milliGRAM(s) IntraMuscular once PRN Glucose LESS THAN 70 milligrams/deciliter  LORazepam     Tablet 0.25 milliGRAM(s) Oral every 6 hours PRN Agitation  ondansetron Injectable 4 milliGRAM(s) IV Push every 6 hours PRN Nausea and/or Vomiting  oxyCODONE    IR 5 milliGRAM(s) Oral every 4 hours PRN Moderate Pain (4 - 6)  oxyCODONE    IR 10 milliGRAM(s) Oral every 4 hours PRN Severe Pain (7 - 10)  senna 2 Tablet(s) Oral at bedtime PRN Constipation      Allergies    No Known Allergies    Intolerances        REVIEW OF SYSTEMS:  CONSTITUTIONAL: No fever, weight loss, or fatigue  EYES: No eye pain, visual disturbances, or discharge  ENMT:  No difficulty hearing, tinnitus, vertigo; No sinus or throat pain  NECK: No pain or stiffness  BREASTS: No pain, masses, or nipple discharge  RESPIRATORY: No cough, wheezing, chills or hemoptysis; No shortness of breath  CARDIOVASCULAR: No chest pain, palpitations, dizziness, or leg swelling  GASTROINTESTINAL: No abdominal or epigastric pain. No nausea, vomiting, or hematemesis; No diarrhea or constipation. No melena or hematochezia.  GENITOURINARY: No dysuria, frequency, hematuria, or incontinence  NEUROLOGICAL: No headaches, memory loss, loss of strength, numbness, or tremors  SKIN: No itching, burning, rashes, or lesions   LYMPH NODES: No enlarged glands  ENDOCRINE: No heat or cold intolerance; No hair loss; No polydipsia or polyuria  MUSCULOSKELETAL: No back pain  PSYCHIATRIC: No depression, anxiety, mood swings, or difficulty sleeping  HEME/LYMPH: No easy bruising, or bleeding gums  ALLERGY AND IMMUNOLOGIC: No hives or eczema    Vital Signs Last 24 Hrs  T(C): 36.4 (11 May 2020 07:59), Max: 36.6 (10 May 2020 13:47)  T(F): 97.6 (11 May 2020 07:59), Max: 97.9 (10 May 2020 13:47)  HR: 79 (11 May 2020 07:59) (68 - 86)  BP: 113/62 (11 May 2020 07:59) (113/62 - 149/67)  BP(mean): --  RR: 19 (11 May 2020 07:59) (18 - 21)  SpO2: 97% (11 May 2020 05:11) (89% - 99%)    PHYSICAL EXAM:  GENERAL: NAD, on room air   HEAD:  Atraumatic, Normocephalic  EYES: EOMI, PERRLA, conjunctiva and sclera clear  ENMT: No tonsillar erythema, exudates, or enlargement; Moist mucous membranes, Good dentition, No lesions  NECK: Supple, No JVD, Normal thyroid  NERVOUS SYSTEM:  Alert & Oriented X3, Good concentration; Motor Strength 5/5 B/L upper and lower extremities; DTRs 2+ intact and symmetric  CHEST/LUNG: Clear to auscultation bilaterallly  HEART: Regular rate and rhythm; No murmurs, rubs, or gallops  ABDOMEN: Soft, Nontender, Nondistended; Bowel sounds present  EXTREMITIES:  left leg splint intact clean and dry, 2+ Peripheral Pulses, No clubbing or cyanosis      LABS:                        8.0    9.07  )-----------( 306      ( 11 May 2020 07:37 )             26.5     11 May 2020 07:37    141    |  103    |  12     ----------------------------<  141    3.8     |  32     |  0.49     Ca    9.2        11 May 2020 07:37    TPro  5.9    /  Alb  2.4    /  TBili  0.5    /  DBili  x      /  AST  22     /  ALT  13     /  AlkPhos  181    11 May 2020 07:37        CAPILLARY BLOOD GLUCOSE      POCT Blood Glucose.: 152 mg/dL (11 May 2020 07:42)  POCT Blood Glucose.: 148 mg/dL (10 May 2020 20:55)  POCT Blood Glucose.: 150 mg/dL (10 May 2020 16:42)  POCT Blood Glucose.: 168 mg/dL (10 May 2020 11:31)        Cultures      RADIOLOGY & ADDITIONAL TESTS:    Imaging Personally Reviewed:  [x ] YES  [ ] NO    Consultant(s) Notes Reviewed:  [x ] YES  [ ] NO    Care Discussed with Consultants/Other Providers [x ] YES  [ ] NO This progress note was completed in part by resident acting as telephonic scribe during COVID-19 crisis. Physical exam and review of systems was completed by attending physician, and findings below are those of the attending physician, and have been reviewed in detail.     Patient is a 85y old  Male who presents with a chief complaint of Destructive lytic lesion of left superior pubic ramus (11 May 2020 07:46)      INTERVAL HPI/OVERNIGHT EVENTS:  Pt seen and examined at bedside. No acute events overnight.     Pain Location & Control: controlled     MEDICATIONS  (STANDING):  ascorbic acid 500 milliGRAM(s) Oral two times a day  dextrose 5%. 1000 milliLiter(s) (50 mL/Hr) IV Continuous <Continuous>  dextrose 50% Injectable 12.5 Gram(s) IV Push once  dextrose 50% Injectable 25 Gram(s) IV Push once  dextrose 50% Injectable 25 Gram(s) IV Push once  enoxaparin Injectable 40 milliGRAM(s) SubCutaneous every 24 hours  finasteride 5 milliGRAM(s) Oral daily  folic acid 1 milliGRAM(s) Oral daily  insulin lispro (HumaLOG) corrective regimen sliding scale   SubCutaneous three times a day before meals  insulin lispro (HumaLOG) corrective regimen sliding scale   SubCutaneous at bedtime  lactated ringers. 1000 milliLiter(s) (50 mL/Hr) IV Continuous <Continuous>  multivitamin 1 Tablet(s) Oral daily  pantoprazole    Tablet 40 milliGRAM(s) Oral before breakfast  polyethylene glycol 3350 17 Gram(s) Oral daily  tamsulosin 0.4 milliGRAM(s) Oral at bedtime    MEDICATIONS  (PRN):  acetaminophen   Tablet .. 650 milliGRAM(s) Oral every 6 hours PRN Temp greater or equal to 38C (100.4F), Mild Pain (1 - 3)  aluminum hydroxide/magnesium hydroxide/simethicone Suspension 30 milliLiter(s) Oral four times a day PRN Indigestion  bisacodyl Suppository 10 milliGRAM(s) Rectal daily PRN If no bowel movement by postoperative day #2  dextrose 40% Gel 15 Gram(s) Oral once PRN Blood Glucose LESS THAN 70 milliGRAM(s)/deciliter  glucagon  Injectable 1 milliGRAM(s) IntraMuscular once PRN Glucose LESS THAN 70 milligrams/deciliter  LORazepam     Tablet 0.25 milliGRAM(s) Oral every 6 hours PRN Agitation  ondansetron Injectable 4 milliGRAM(s) IV Push every 6 hours PRN Nausea and/or Vomiting  oxyCODONE    IR 5 milliGRAM(s) Oral every 4 hours PRN Moderate Pain (4 - 6)  oxyCODONE    IR 10 milliGRAM(s) Oral every 4 hours PRN Severe Pain (7 - 10)  senna 2 Tablet(s) Oral at bedtime PRN Constipation      Allergies    No Known Allergies    Intolerances        REVIEW OF SYSTEMS:  CONSTITUTIONAL: No fever, weight loss, or fatigue  EYES: No eye pain, visual disturbances, or discharge  ENMT:  No difficulty hearing, tinnitus, vertigo; No sinus or throat pain  NECK: No pain or stiffness  BREASTS: No pain, masses, or nipple discharge  RESPIRATORY: No cough, wheezing, chills or hemoptysis; No shortness of breath  CARDIOVASCULAR: No chest pain, palpitations, dizziness, or leg swelling  GASTROINTESTINAL: No abdominal or epigastric pain. No nausea, vomiting, or hematemesis; No diarrhea or constipation. No melena or hematochezia.  GENITOURINARY: No dysuria, frequency, hematuria, or incontinence  NEUROLOGICAL: No headaches, memory loss, loss of strength, numbness, or tremors  SKIN: No itching, burning, rashes, or lesions   LYMPH NODES: No enlarged glands      Vital Signs Last 24 Hrs  T(C): 36.4 (11 May 2020 07:59), Max: 36.6 (10 May 2020 13:47)  T(F): 97.6 (11 May 2020 07:59), Max: 97.9 (10 May 2020 13:47)  HR: 79 (11 May 2020 07:59) (68 - 86)  BP: 113/62 (11 May 2020 07:59) (113/62 - 149/67)  BP(mean): --  RR: 19 (11 May 2020 07:59) (18 - 21)  SpO2: 97% (11 May 2020 05:11) (89% - 99%)    PHYSICAL EXAM:  GENERAL: NAD, on room air   HEAD:  Atraumatic, Normocephalic  EYES: EOMI, PERRLA, conjunctiva and sclera clear  ENMT: No tonsillar erythema, exudates, or enlargement; Moist mucous membranes, Good dentition, No lesions  NECK: Supple, No JVD, Normal thyroid  NERVOUS SYSTEM:  Alert & Oriented X3, Good concentration; Motor Strength 5/5 B/L upper and lower extremities; DTRs 2+ intact and symmetric  CHEST/LUNG: Clear to auscultation bilaterallly  HEART: Regular rate and rhythm; No murmurs, rubs, or gallops  ABDOMEN: Soft, Nontender, Nondistended; Bowel sounds present  EXTREMITIES:  left leg splint intact clean and dry, 2+ Peripheral Pulses, No clubbing or cyanosis      LABS:                        8.0    9.07  )-----------( 306      ( 11 May 2020 07:37 )             26.5     11 May 2020 07:37    141    |  103    |  12     ----------------------------<  141    3.8     |  32     |  0.49     Ca    9.2        11 May 2020 07:37    TPro  5.9    /  Alb  2.4    /  TBili  0.5    /  DBili  x      /  AST  22     /  ALT  13     /  AlkPhos  181    11 May 2020 07:37        CAPILLARY BLOOD GLUCOSE      POCT Blood Glucose.: 152 mg/dL (11 May 2020 07:42)  POCT Blood Glucose.: 148 mg/dL (10 May 2020 20:55)  POCT Blood Glucose.: 150 mg/dL (10 May 2020 16:42)  POCT Blood Glucose.: 168 mg/dL (10 May 2020 11:31)        Cultures      RADIOLOGY & ADDITIONAL TESTS:    Imaging Personally Reviewed:  [x ] YES  [ ] NO    Consultant(s) Notes Reviewed:  [x ] YES  [ ] NO    Care Discussed with Consultants/Other Providers [x ] YES  [ ] NO

## 2022-02-03 NOTE — PROGRESS NOTE ADULT - PROBLEM SELECTOR PLAN 4
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 Mauri White at [facility]  - Pt on home hospice   - oxygen supplementation prn 124

## 2022-10-17 NOTE — PROGRESS NOTE ADULT - PROBLEM SELECTOR PROBLEM 4
Maurilio Leslie is a 78 year old male presenting for   Chief Complaint   Patient presents with   • Follow-up     Denies Latex allergy or sensitivity.    Medication verified and med list updated  Refills needed today: No    Health Maintenance Due   Topic Date Due   • Hepatitis B Vaccine (1 of 3 - 3-dose series) Never done   • DM/CKD Microalbumin  Never done   • Shingles Vaccine (1 of 2) Never done   • DTaP/Tdap/Td Vaccine (1 - Tdap) 07/04/2019   • Lung Cancer Screening  12/10/2019   • COVID-19 Vaccine (3 - Moderna risk series) 05/12/2021   • Colorectal Cancer Risk - Colonoscopy  02/08/2022       Patient is due for the topics as listed above and wishes to proceed with them.                     Depression Screening:  Recent Review Flowsheet Data     Date 6/16/2022    Adult PHQ 2 Score 2    Adult PHQ 2 Interpretation No further screening needed    Little interest or pleasure in activity? Several days    Feeling down, depressed or hopeless? Several days           Advance Directives:  on file     Lung cancer

## 2023-09-19 NOTE — ED ADULT NURSE NOTE - SKIN INTEGRITY
bruising Olanzapine Counseling- I discussed with the patient the common side effects of olanzapine including but are not limited to: lack of energy, dry mouth, increased appetite, sleepiness, tremor, constipation, dizziness, changes in behavior, or restlessness.  Explained that teenagers are more likely to experience headaches, abdominal pain, pain in the arms or legs, tiredness, and sleepiness.  Serious side effects include but are not limited: increased risk of death in elderly patients who are confused, have memory loss, or dementia-related psychosis; hyperglycemia; increased cholesterol and triglycerides; and weight gain.

## 2024-03-29 NOTE — PHYSICAL THERAPY INITIAL EVALUATION ADULT - TRANSFER TRAINING, PT EVAL
Apply a compressive ACE bandage. Rest and elevate the affected painful area.  Apply cold compresses intermittently as needed.  As pain recedes, begin normal activities slowly as tolerated.  Call if symptoms persist.     2 to 3 Sessions Sit to Stand Min A